# Patient Record
Sex: FEMALE | Race: WHITE | NOT HISPANIC OR LATINO | Employment: OTHER | URBAN - NONMETROPOLITAN AREA
[De-identification: names, ages, dates, MRNs, and addresses within clinical notes are randomized per-mention and may not be internally consistent; named-entity substitution may affect disease eponyms.]

---

## 2023-02-28 LAB — URINE CULTURE: NORMAL

## 2023-03-03 LAB
GENITAL CULTURE: ABNORMAL
GENITAL CULTURE: ABNORMAL

## 2023-03-28 LAB
APPEARANCE, URINE: ABNORMAL
BACTERIA, URINE: ABNORMAL /HPF
BILIRUBIN, URINE: NEGATIVE
BLOOD, URINE: NEGATIVE
COLOR, URINE: YELLOW
GLUCOSE, URINE: NEGATIVE MG/DL
KETONES, URINE: NEGATIVE MG/DL
LEUKOCYTE ESTERASE, URINE: NEGATIVE
NITRITE, URINE: NEGATIVE
PH, URINE: 7 (ref 5–8)
PROTEIN, URINE: ABNORMAL MG/DL
RBC, URINE: <1 /HPF (ref 0–5)
SPECIFIC GRAVITY, URINE: 1.02 (ref 1–1.03)
SQUAMOUS EPITHELIAL CELLS, URINE: 1 /HPF
UROBILINOGEN, URINE: <2 MG/DL (ref 0–1.9)
WBC, URINE: <1 /HPF (ref 0–5)

## 2023-04-17 LAB
APPEARANCE, URINE: CLEAR
BILIRUBIN, URINE: NEGATIVE
BLOOD, URINE: ABNORMAL
COLOR, URINE: YELLOW
GLUCOSE, URINE: NEGATIVE MG/DL
KETONES, URINE: NEGATIVE MG/DL
LEUKOCYTE ESTERASE, URINE: ABNORMAL
MUCUS, URINE: ABNORMAL /LPF
NITRITE, URINE: NEGATIVE
PH, URINE: 7 (ref 5–8)
PROTEIN, URINE: NEGATIVE MG/DL
RBC, URINE: 8 /HPF (ref 0–5)
SPECIFIC GRAVITY, URINE: 1.01 (ref 1–1.03)
SQUAMOUS EPITHELIAL CELLS, URINE: 1 /HPF
UROBILINOGEN, URINE: <2 MG/DL (ref 0–1.9)
WBC, URINE: 12 /HPF (ref 0–5)

## 2023-04-19 LAB — URINE CULTURE: ABNORMAL

## 2023-06-26 DIAGNOSIS — I10 HYPERTENSION, UNSPECIFIED TYPE: ICD-10-CM

## 2023-06-26 DIAGNOSIS — E78.5 HYPERLIPIDEMIA, UNSPECIFIED HYPERLIPIDEMIA TYPE: ICD-10-CM

## 2023-06-26 DIAGNOSIS — K21.9 GASTROESOPHAGEAL REFLUX DISEASE, UNSPECIFIED WHETHER ESOPHAGITIS PRESENT: ICD-10-CM

## 2023-06-26 RX ORDER — HYDROCHLOROTHIAZIDE 12.5 MG/1
1 TABLET ORAL DAILY
COMMUNITY
End: 2023-06-26 | Stop reason: SDUPTHER

## 2023-06-26 RX ORDER — ATENOLOL 50 MG/1
50 TABLET ORAL DAILY
Qty: 30 TABLET | Refills: 1 | Status: SHIPPED | OUTPATIENT
Start: 2023-06-26 | End: 2023-10-09 | Stop reason: SDUPTHER

## 2023-06-26 RX ORDER — HYDROCHLOROTHIAZIDE 12.5 MG/1
12.5 TABLET ORAL DAILY
Qty: 30 TABLET | Refills: 1 | Status: SHIPPED | OUTPATIENT
Start: 2023-06-26 | End: 2023-07-10 | Stop reason: SDUPTHER

## 2023-06-26 RX ORDER — PANTOPRAZOLE SODIUM 40 MG/1
1 TABLET, DELAYED RELEASE ORAL DAILY
COMMUNITY
Start: 2020-03-13 | End: 2023-06-26 | Stop reason: SDUPTHER

## 2023-06-26 RX ORDER — ATENOLOL 50 MG/1
1 TABLET ORAL DAILY
COMMUNITY
End: 2023-06-26 | Stop reason: SDUPTHER

## 2023-06-26 RX ORDER — PANTOPRAZOLE SODIUM 40 MG/1
40 TABLET, DELAYED RELEASE ORAL DAILY
Qty: 30 TABLET | Refills: 1 | Status: SHIPPED | OUTPATIENT
Start: 2023-06-26 | End: 2023-10-09 | Stop reason: SDUPTHER

## 2023-06-26 RX ORDER — ROSUVASTATIN CALCIUM 10 MG/1
10 TABLET, COATED ORAL DAILY
Qty: 30 TABLET | Refills: 1 | Status: SHIPPED | OUTPATIENT
Start: 2023-06-26 | End: 2023-10-09 | Stop reason: SDUPTHER

## 2023-06-26 RX ORDER — ROSUVASTATIN CALCIUM 10 MG/1
1 TABLET, COATED ORAL DAILY
COMMUNITY
End: 2023-06-26 | Stop reason: SDUPTHER

## 2023-07-05 ENCOUNTER — TELEPHONE (OUTPATIENT)
Dept: PRIMARY CARE | Facility: CLINIC | Age: 78
End: 2023-07-05
Payer: MEDICARE

## 2023-07-05 DIAGNOSIS — J31.0 CHRONIC RHINITIS: ICD-10-CM

## 2023-07-05 DIAGNOSIS — I10 HYPERTENSION, UNSPECIFIED TYPE: ICD-10-CM

## 2023-07-05 DIAGNOSIS — G45.9 TIA (TRANSIENT ISCHEMIC ATTACK): Primary | ICD-10-CM

## 2023-07-05 RX ORDER — ASPIRIN 81 MG/1
81 TABLET ORAL DAILY
COMMUNITY
Start: 2023-01-03 | End: 2023-07-05 | Stop reason: SDUPTHER

## 2023-07-05 RX ORDER — FLUTICASONE PROPIONATE 50 MCG
2 SPRAY, SUSPENSION (ML) NASAL DAILY
COMMUNITY
End: 2023-07-05 | Stop reason: SDUPTHER

## 2023-07-05 RX ORDER — ASPIRIN 81 MG/1
81 TABLET ORAL DAILY
Qty: 90 TABLET | Refills: 3 | Status: SHIPPED | OUTPATIENT
Start: 2023-07-05 | End: 2024-02-28 | Stop reason: SDUPTHER

## 2023-07-05 RX ORDER — FLUTICASONE PROPIONATE 50 MCG
2 SPRAY, SUSPENSION (ML) NASAL DAILY
Qty: 48 G | Refills: 3 | Status: SHIPPED | OUTPATIENT
Start: 2023-07-05 | End: 2024-02-28 | Stop reason: SDUPTHER

## 2023-07-10 ENCOUNTER — TELEPHONE (OUTPATIENT)
Dept: PRIMARY CARE | Facility: CLINIC | Age: 78
End: 2023-07-10

## 2023-07-10 ENCOUNTER — APPOINTMENT (OUTPATIENT)
Dept: PRIMARY CARE | Facility: CLINIC | Age: 78
End: 2023-07-10
Payer: MEDICARE

## 2023-07-10 ENCOUNTER — OFFICE VISIT (OUTPATIENT)
Dept: PRIMARY CARE | Facility: CLINIC | Age: 78
End: 2023-07-10
Payer: MEDICARE

## 2023-07-10 VITALS
SYSTOLIC BLOOD PRESSURE: 138 MMHG | DIASTOLIC BLOOD PRESSURE: 82 MMHG | HEIGHT: 64 IN | BODY MASS INDEX: 29.49 KG/M2 | HEART RATE: 76 BPM | WEIGHT: 172.7 LBS | OXYGEN SATURATION: 96 %

## 2023-07-10 DIAGNOSIS — R60.9 PERIPHERAL EDEMA: Primary | ICD-10-CM

## 2023-07-10 DIAGNOSIS — I10 HYPERTENSION, UNSPECIFIED TYPE: ICD-10-CM

## 2023-07-10 DIAGNOSIS — I50.22 CHRONIC SYSTOLIC CONGESTIVE HEART FAILURE (MULTI): ICD-10-CM

## 2023-07-10 DIAGNOSIS — I82.461 ACUTE DEEP VEIN THROMBOSIS (DVT) OF CALF MUSCLE VEIN OF RIGHT LOWER EXTREMITY (MULTI): ICD-10-CM

## 2023-07-10 LAB
APPEARANCE, URINE: ABNORMAL
BACTERIA, URINE: ABNORMAL /HPF
BILIRUBIN, URINE: NEGATIVE
BLOOD, URINE: NEGATIVE
BUDDING YEAST, URINE: PRESENT /HPF
COLOR, URINE: YELLOW
GLUCOSE, URINE: NEGATIVE MG/DL
KETONES, URINE: NEGATIVE MG/DL
LEUKOCYTE ESTERASE, URINE: ABNORMAL
MUCUS, URINE: ABNORMAL /LPF
NITRITE, URINE: NEGATIVE
PH, URINE: 7 (ref 5–8)
PROTEIN, URINE: NEGATIVE MG/DL
RBC, URINE: 2 /HPF (ref 0–5)
RENAL EPITHELIAL CELLS, URINE: 1 /HPF
SPECIFIC GRAVITY, URINE: 1.01 (ref 1–1.03)
SQUAMOUS EPITHELIAL CELLS, URINE: 22 /HPF
UROBILINOGEN, URINE: <2 MG/DL (ref 0–1.9)
WBC, URINE: 29 /HPF (ref 0–5)

## 2023-07-10 PROCEDURE — 1036F TOBACCO NON-USER: CPT | Performed by: FAMILY MEDICINE

## 2023-07-10 PROCEDURE — 3079F DIAST BP 80-89 MM HG: CPT | Performed by: FAMILY MEDICINE

## 2023-07-10 PROCEDURE — 99214 OFFICE O/P EST MOD 30 MIN: CPT | Performed by: FAMILY MEDICINE

## 2023-07-10 PROCEDURE — 3075F SYST BP GE 130 - 139MM HG: CPT | Performed by: FAMILY MEDICINE

## 2023-07-10 PROCEDURE — 1160F RVW MEDS BY RX/DR IN RCRD: CPT | Performed by: FAMILY MEDICINE

## 2023-07-10 PROCEDURE — 1159F MED LIST DOCD IN RCRD: CPT | Performed by: FAMILY MEDICINE

## 2023-07-10 RX ORDER — HYDROCHLOROTHIAZIDE 25 MG/1
25 TABLET ORAL DAILY
Qty: 90 TABLET | Refills: 3 | Status: SHIPPED | OUTPATIENT
Start: 2023-07-10 | End: 2024-02-28 | Stop reason: SDUPTHER

## 2023-07-10 NOTE — PROGRESS NOTES
"Subjective   Patient ID: Cele Lunsford is a 78 y.o. female who presents for Leg Swelling (X 1 mo).    HPI feelsa off send ua to urogyn awaits result.  Leg edema increased, inc hydrochlorothiazide to 25mg and if not better 1 montyh swithch to lasix and get echo  Differential diagnosis for vascular ischemic and hypertensive heart disease reviewed.  Also known to have heart failure.  Review of Systems  Denies PND orthopnea.  No chest pains pressure heaviness.  Objective   /82   Pulse 76   Ht 1.626 m (5' 4\")   Wt 78.3 kg (172 lb 11.2 oz)   SpO2 96%   BMI 29.64 kg/m²     Physical Exam  Neck supple heart regular lungs clear extremities 1+ edema  Assessment/Plan   Problem List Items Addressed This Visit          Coag and Thromboembolic    Acute deep vein thrombosis (DVT) of calf muscle vein of right lower extremity (CMS/HCC)     Other Visit Diagnoses       Peripheral edema    -  Primary    Relevant Medications    hydroCHLOROthiazide (HYDRODiuril) 25 mg tablet    Hypertension, unspecified type        Relevant Medications    hydroCHLOROthiazide (HYDRODiuril) 25 mg tablet    Chronic systolic congestive heart failure (CMS/HCC)        Relevant Orders    Transthoracic echo (TTE) complete        Increase chlorothiazide 25 mg daily not better in 2 weeks consider replacing with Lasix and getting echocardiogram       "

## 2023-07-11 LAB — URINE CULTURE: ABNORMAL

## 2023-07-14 ENCOUNTER — TELEPHONE (OUTPATIENT)
Dept: PRIMARY CARE | Facility: CLINIC | Age: 78
End: 2023-07-14
Payer: MEDICARE

## 2023-07-14 NOTE — TELEPHONE ENCOUNTER
----- Message from Jimmy Mchugh MD sent at 7/13/2023  5:25 PM EDT -----  Let patient know echo is okay

## 2023-08-22 LAB
APPEARANCE, URINE: ABNORMAL
BACTERIA, URINE: ABNORMAL /HPF
BILIRUBIN, URINE: NEGATIVE
BLOOD, URINE: NEGATIVE
COLOR, URINE: YELLOW
GLUCOSE, URINE: NEGATIVE MG/DL
KETONES, URINE: NEGATIVE MG/DL
LEUKOCYTE ESTERASE, URINE: ABNORMAL
MUCUS, URINE: ABNORMAL /LPF
NITRITE, URINE: NEGATIVE
PH, URINE: 7 (ref 5–8)
PROTEIN, URINE: ABNORMAL MG/DL
RBC, URINE: 10 /HPF (ref 0–5)
SPECIFIC GRAVITY, URINE: 1.02 (ref 1–1.03)
SQUAMOUS EPITHELIAL CELLS, URINE: 4 /HPF
UROBILINOGEN, URINE: <2 MG/DL (ref 0–1.9)
WBC CLUMPS, URINE: ABNORMAL /HPF
WBC, URINE: >182 /HPF (ref 0–5)

## 2023-08-24 ENCOUNTER — OFFICE VISIT (OUTPATIENT)
Dept: PRIMARY CARE | Facility: CLINIC | Age: 78
End: 2023-08-24
Payer: MEDICARE

## 2023-08-24 VITALS
OXYGEN SATURATION: 98 % | SYSTOLIC BLOOD PRESSURE: 140 MMHG | BODY MASS INDEX: 29.23 KG/M2 | WEIGHT: 171.2 LBS | DIASTOLIC BLOOD PRESSURE: 86 MMHG | HEART RATE: 68 BPM | HEIGHT: 64 IN

## 2023-08-24 DIAGNOSIS — I82.461 ACUTE DEEP VEIN THROMBOSIS (DVT) OF CALF MUSCLE VEIN OF RIGHT LOWER EXTREMITY (MULTI): ICD-10-CM

## 2023-08-24 DIAGNOSIS — I10 BENIGN ESSENTIAL HTN: ICD-10-CM

## 2023-08-24 DIAGNOSIS — J30.2 SEASONAL ALLERGIES: ICD-10-CM

## 2023-08-24 DIAGNOSIS — E78.2 MIXED HYPERLIPIDEMIA: ICD-10-CM

## 2023-08-24 DIAGNOSIS — K21.9 GASTROESOPHAGEAL REFLUX DISEASE WITHOUT ESOPHAGITIS: ICD-10-CM

## 2023-08-24 DIAGNOSIS — Z00.00 ROUTINE GENERAL MEDICAL EXAMINATION AT HEALTH CARE FACILITY: Primary | ICD-10-CM

## 2023-08-24 PROBLEM — I82.409 DVT (DEEP VENOUS THROMBOSIS) (MULTI): Status: RESOLVED | Noted: 2023-08-24 | Resolved: 2023-08-24

## 2023-08-24 PROBLEM — E78.5 HYPERLIPEMIA: Status: ACTIVE | Noted: 2023-08-24

## 2023-08-24 PROBLEM — I82.409 DVT (DEEP VENOUS THROMBOSIS) (MULTI): Status: ACTIVE | Noted: 2023-08-24

## 2023-08-24 PROCEDURE — 3077F SYST BP >= 140 MM HG: CPT | Performed by: FAMILY MEDICINE

## 2023-08-24 PROCEDURE — 1036F TOBACCO NON-USER: CPT | Performed by: FAMILY MEDICINE

## 2023-08-24 PROCEDURE — 3079F DIAST BP 80-89 MM HG: CPT | Performed by: FAMILY MEDICINE

## 2023-08-24 PROCEDURE — 1160F RVW MEDS BY RX/DR IN RCRD: CPT | Performed by: FAMILY MEDICINE

## 2023-08-24 PROCEDURE — 1170F FXNL STATUS ASSESSED: CPT | Performed by: FAMILY MEDICINE

## 2023-08-24 PROCEDURE — 1159F MED LIST DOCD IN RCRD: CPT | Performed by: FAMILY MEDICINE

## 2023-08-24 PROCEDURE — 99214 OFFICE O/P EST MOD 30 MIN: CPT | Performed by: FAMILY MEDICINE

## 2023-08-24 PROCEDURE — G0439 PPPS, SUBSEQ VISIT: HCPCS | Performed by: FAMILY MEDICINE

## 2023-08-24 ASSESSMENT — ACTIVITIES OF DAILY LIVING (ADL)
DOING_HOUSEWORK: INDEPENDENT
GROCERY_SHOPPING: INDEPENDENT
DRESSING: INDEPENDENT
MANAGING_FINANCES: INDEPENDENT
TAKING_MEDICATION: INDEPENDENT
BATHING: INDEPENDENT

## 2023-08-24 ASSESSMENT — ENCOUNTER SYMPTOMS
OCCASIONAL FEELINGS OF UNSTEADINESS: 1
LOSS OF SENSATION IN FEET: 0
DEPRESSION: 0

## 2023-08-24 ASSESSMENT — PATIENT HEALTH QUESTIONNAIRE - PHQ9
2. FEELING DOWN, DEPRESSED OR HOPELESS: NOT AT ALL
1. LITTLE INTEREST OR PLEASURE IN DOING THINGS: NOT AT ALL
SUM OF ALL RESPONSES TO PHQ9 QUESTIONS 1 AND 2: 0

## 2023-08-24 NOTE — PROGRESS NOTES
"Subjective   Reason for Visit: Cele Lunsford is an 78 y.o. female here for a Medicare Wellness visit.     Past Medical, Surgical, and Family History reviewed and updated in chart.    Reviewed all medications by prescribing practitioner or clinical pharmacist (such as prescriptions, OTCs, herbal therapies and supplements) and documented in the medical record.    HPI  1 yr ago a fall woth leg injury, Since the last office visit there have been no interval operations, hospitalizations, important illnesses or injuries.  Dr royal is treating for uti  HTN-Takes and tolerates meds without side effects. No alcohol. no tobacco. no exercise. low salt.  Reviewed recommendation for 150 minutes of exercise per week including 2 days of weight training if over age 50  Hyperlipidemia- is on a statin and a prudent diet.  GERD-Takes PPI daily with occbreakthrough symptoms, occ pepcid.  Reviewed dietary, caffeine, tobacco, alcohol, and NSAID avoidance. No dyspepsia, dysphagia, reflux, melena, or abdominal pain.  Allergic rhinits use s nasal steroid and claritin  Uti-  Patient Care Team:  Jimmy Mchugh MD as PCP - General  Jim[facility] Mchugh [facility]     Review of Systems  General-no fatigue weight to within 10 pounds  ENT no problems with vision swallowing  Cardiac no chest pains palpitations change in exercise tolerance or capacity  Pulmonary no cough shortness of breath  GI no heartburn or abdominal pain  Musculoskeletal no joint pains      Objective   Vitals:  /86   Pulse 68   Ht 1.626 m (5' 4\")   Wt 77.7 kg (171 lb 3.2 oz)   SpO2 98%   BMI 29.39 kg/m²       Physical Exam  General:  Alert, No acute distress. Appears stated age  Eye:  Pupils are equal, round and reactive to light, Extraocular movements are intact, Normal conjunctiva.    Neck:  Supple, Non-tender, No carotid bruit, No jugular venous distention, No lymphadenopathy, No thyromegaly.    Respiratory:  Lungs are " clear to auscultation, Respirations are non-labored, Breath sounds are equal.    Cardiovascular:  Normal rate, Regular rhythm, No murmur.    Gastrointestinal:  Soft, Non-tender, No organomegaly. No solid or pulsatile mass  Integumentary:  Warm, Dry. No concerning lesions on exposed areas  Neurologic:  Alert, Oriented.  Gross and fine motor intact, CN 2-12 intact  Psychiatric:  Cooperative, Appropriate mood & affect.    Assessment/Plan   Problem List Items Addressed This Visit       Acute deep vein thrombosis (DVT) of calf muscle vein of right lower extremity (CMS/HCC)    Overview     Dvt sept 2022, completed course of eliquis         Benign essential HTN    Hyperlipemia    Seasonal allergies    Gastroesophageal reflux disease without esophagitis     Other Visit Diagnoses       Routine general medical examination at health care facility    -  Primary

## 2023-08-25 LAB — URINE CULTURE: ABNORMAL

## 2023-10-09 DIAGNOSIS — I10 HYPERTENSION, UNSPECIFIED TYPE: ICD-10-CM

## 2023-10-09 DIAGNOSIS — E78.5 HYPERLIPIDEMIA, UNSPECIFIED HYPERLIPIDEMIA TYPE: ICD-10-CM

## 2023-10-09 DIAGNOSIS — K21.9 GASTROESOPHAGEAL REFLUX DISEASE, UNSPECIFIED WHETHER ESOPHAGITIS PRESENT: ICD-10-CM

## 2023-10-09 RX ORDER — ATENOLOL 50 MG/1
50 TABLET ORAL DAILY
Qty: 90 TABLET | Refills: 3 | Status: SHIPPED | OUTPATIENT
Start: 2023-10-09 | End: 2024-02-28 | Stop reason: SDUPTHER

## 2023-10-09 RX ORDER — ROSUVASTATIN CALCIUM 10 MG/1
10 TABLET, COATED ORAL DAILY
Qty: 90 TABLET | Refills: 3 | Status: SHIPPED | OUTPATIENT
Start: 2023-10-09 | End: 2024-02-28 | Stop reason: SDUPTHER

## 2023-10-09 RX ORDER — PANTOPRAZOLE SODIUM 40 MG/1
40 TABLET, DELAYED RELEASE ORAL DAILY
Qty: 90 TABLET | Refills: 3 | Status: SHIPPED | OUTPATIENT
Start: 2023-10-09 | End: 2024-02-28 | Stop reason: SDUPTHER

## 2023-11-15 ENCOUNTER — EVALUATION (OUTPATIENT)
Dept: PHYSICAL THERAPY | Facility: CLINIC | Age: 78
End: 2023-11-15
Payer: MEDICARE

## 2023-11-15 DIAGNOSIS — M54.2 CERVICALGIA: Primary | ICD-10-CM

## 2023-11-15 DIAGNOSIS — S03.00XD DISLOCATION OF TEMPOROMANDIBULAR JOINT, SUBSEQUENT ENCOUNTER: ICD-10-CM

## 2023-11-15 PROBLEM — S03.00XA TMJ (DISLOCATION OF TEMPOROMANDIBULAR JOINT): Status: ACTIVE | Noted: 2023-11-15

## 2023-11-15 PROCEDURE — 97140 MANUAL THERAPY 1/> REGIONS: CPT | Mod: GP

## 2023-11-15 PROCEDURE — 97161 PT EVAL LOW COMPLEX 20 MIN: CPT | Mod: GP

## 2023-11-15 ASSESSMENT — PATIENT HEALTH QUESTIONNAIRE - PHQ9
SUM OF ALL RESPONSES TO PHQ9 QUESTIONS 1 AND 2: 0
2. FEELING DOWN, DEPRESSED OR HOPELESS: NOT AT ALL
1. LITTLE INTEREST OR PLEASURE IN DOING THINGS: NOT AT ALL

## 2023-11-15 ASSESSMENT — ENCOUNTER SYMPTOMS
OCCASIONAL FEELINGS OF UNSTEADINESS: 1
DEPRESSION: 0
LOSS OF SENSATION IN FEET: 0

## 2023-11-15 ASSESSMENT — PAIN SCALES - GENERAL: PAINLEVEL_OUTOF10: 0 - NO PAIN

## 2023-11-15 ASSESSMENT — PAIN - FUNCTIONAL ASSESSMENT: PAIN_FUNCTIONAL_ASSESSMENT: 0-10

## 2023-11-15 NOTE — PROGRESS NOTES
Physical Therapy    Physical Therapy Evaluation and Treatment      Patient Name: Cele Lunsford  MRN: [id number]  Today's Date: 11/15/2023  Time Calculation  Start Time: 1130  Stop Time: 1215  Time Calculation (min): 45 min      Assessment:  PT Assessment Results: Decreased strength, Decreased range of motion, Pain  Rehab Prognosis: Good  Evaluation/Treatment Tolerance: Patient tolerated treatment well  Strengths: Ability to acquire knowledge, Capable of completing ADLs semi/independent, Physical health  Barriers to Participation:  (chronicity of pain)  Patient demonstrating pain of R ear/cervical region, deficits in cervical AROM, restriction of surrounding cervical, B GH joint musculature, deficits of cervical, B GH joint, and periscapular musculature strength, and deficits in functional mobility per TMD Disability Index. This impairs patient's ability to complete ADLs/iADLs painfree. Patient would benefit from skilled PT interventions 2x/week for 6 additional visits to address above impairments and improve QOL. Educated in cervical mobility exercises with multiple verbal and tactile cues for proper form. STM of cervical/shoulder region with patient denoting decrease in pain and fullness of R ear following manual therapy techniques with no numeric value provided. HEP handout provided.    Plan:  Treatment/Interventions: Hot pack, Education/ Instruction, Manual therapy, Therapeutic exercises, Taping techniques  PT Plan: Skilled PT  PT Frequency: 2 times per week  Duration: 6 additional visits in POC  Onset Date: 10/24/23  Certification Period Start Date: 11/15/23  Certification Period End Date: 02/13/24    Current Problem:   1. Cervicalgia  Follow Up In Physical Therapy      2. Dislocation of temporomandibular joint, subsequent encounter  Referral to Physical Therapy    Follow Up In Physical Therapy          Subjective    General: Patient presenting with R ear pain/clogged sensation. Notes with cooler weather it  has improved. No issues with the L ear. Pain also radiates down the cervical region. She takes allergy medicine and aleve which somewhat alleviates symptoms. Per patient, she has arthritis present with a eustachian tube twisted. No current follow-up plan with referring provider. Pain that radiates into the R shoulder. No pattern to pain. Some days pain is worse than others.  General  Reason for Referral: TMJ/Ear pain  Referred By: Cornell REN  Precautions:  Precautions  STEADI Fall Risk Score (The score of 4 or more indicates an increased risk of falling): 1  Precautions Comment: Hx of osteoporosis, HTN, Hx of DVT of LE    Pain:  Pain Assessment  Pain Assessment: 0-10  Pain Score: 0 - No pain  Home Living:   No concerns for home set-up  Prior Level of Function:  Prior Function Per Pt/Caregiver Report  Level of [place]: Independent with ADLs and functional transfers    Objective   Cervical Spine    Shoulder Observation  Cervical Posture: slightly forward head    Cervical AROM WFL unless documented below  Cervical flexion: (80°): 40 deg  Cervical extension: (50°): 38 deg  Cervical Rotation: (80°): 40 deg  Cervical rotation left: (80°): 20 deg  Cervical sidebend right: (45°): 15 deg  Cervical sidebend left: (45°): 55 deg  Shoulder AROM WFL unless documented below  R Shoulder flexion: (180°): WFL  L Shoulder flexion: (180°): WFL  R shoulder abduction: (180°): WFL  L Shoulder abduction: (180°): WFL  R Shoulder ER: (90°): WFL  L shoulder ER: (90°): WFL  R shoulder IR: (70°): mild restriction  L shoulder IR: (70°): mild restriction    Cervical Myotomes (MMT) WFL unless documented below  Cervical Myotomes (MMT) WFL:  (gross cervical musculature 4-/5 MMT in all planes)  Shoulder Strength WFL unless documented below  R shoulder flexion: (5/5): 4  L shoulder flexion: (5/5): 4  R shoulder abduction: (5/5): 4  L shoulder abduction: (5/5): 4  R shoulder ER: (5/5): 4-  L shoulder ER: (5/5): 4-  R shoulder IR: (5/5) : 4-  L  shoulder IR: (5/5): 4-  Scapular MMT WFL unless documented below  Scapular MMT WFL:  (weak retraction with scapular squeeze)  Outcome Measures:  Other Measures  TMD Disability Index: 2/40=5%     Treatments:  Therapeutic Exercise  Therapeutic Exercise Performed: Yes  UT stretch 3x20 second each direction  Seated scalene stretch 3x20 second each direction    Manual Therapy  Manual Therapy Performed: Yes  STM with patient in hooklying of B scalenes, UT, SCM, cervical paraspinals, suboccipitals R>L with moderate to heavy manual pressure    OP EDUCATION:  Outpatient Education  Individual(s) Educated: Patient  Education Provided: Home Exercise Program, POC  Risk and Benefits Discussed with Patient/Caregiver/Other: yes  Patient/Caregiver Demonstrated Understanding: yes  Plan of Care Discussed and Agreed Upon: yes  Patient Response to Education: Patient/Caregiver Verbalized Understanding of Information, Patient/Caregiver Performed Return Demonstration of Exercises/Activities, Patient/Caregiver Asked Appropriate Questions  Education Comment: Access Code: [email or web address]  URL: [email or web address]  Date: 11/15/2023  Prepared by: Brittany Salcido    Exercises  - Seated Upper Trapezius Stretch  - 1 x daily - 7 x weekly - 1 sets - 3 reps - 20-30second hold  - Seated Scalene Stretch with Towel  - 1 x daily - 7 x weekly - 1 sets - 3 reps - 20-30second hold    Goals:  Active       PT Problem       PT Goals       Start:  11/19/23    Expected End:  12/31/23       Decrease in TMD Disability Index score by 5% to demonstrate improved painfree functional mobility for ease with completing ADLs/iADLs-Week 3  Improved gross cervical , B GH joint, and B periscapular musculature strength 5/5 MMT to increase stability with completing household duties.-Week 3  Decrease in baseline ear pain 0/10 or less to improve QOL-Week 3  Improved cervical AROM  flex: 45 deg ext: 45 deg, R LF: 55 deg, B rot: 70 deg to increase painfree  functional mobility of cervical range for ease with ADL/iADL completion-Week 3  Patient will demonstrate compliance in their home exercise program in order to promote independence in self management of functional mobility.-Week 1

## 2023-12-01 ENCOUNTER — TREATMENT (OUTPATIENT)
Dept: PHYSICAL THERAPY | Facility: CLINIC | Age: 78
End: 2023-12-01
Payer: MEDICARE

## 2023-12-01 DIAGNOSIS — M54.2 CERVICALGIA: Primary | ICD-10-CM

## 2023-12-01 DIAGNOSIS — S03.00XD DISLOCATION OF TEMPOROMANDIBULAR JOINT, SUBSEQUENT ENCOUNTER: ICD-10-CM

## 2023-12-01 PROCEDURE — 97110 THERAPEUTIC EXERCISES: CPT | Mod: GP

## 2023-12-01 PROCEDURE — 97140 MANUAL THERAPY 1/> REGIONS: CPT | Mod: GP

## 2023-12-01 ASSESSMENT — PAIN - FUNCTIONAL ASSESSMENT: PAIN_FUNCTIONAL_ASSESSMENT: 0-10

## 2023-12-01 ASSESSMENT — PAIN SCALES - GENERAL: PAINLEVEL_OUTOF10: 1

## 2023-12-01 NOTE — PROGRESS NOTES
Physical Therapy    Physical Therapy Treatment    Patient Name: Cele Lunsford  MRN: [id number]  Today's Date: 12/1/2023   Time In: 1045  Time Out: 1128  Total Time: 43 min      Assessment:   Emphasis of postural strengthening and cervical mobility. Verbal, visual, and tactile cues for proper form with stretches as well as for form with scapular strengthening to reduce compensation. Verbal cues for controlled motion. Increased restriction of B SCM, scalenes, and UT. Reduced pain and improved mobility at end of session.      Plan: Progress with postural strengthening and cervical mobility to reduce ear pain and improve ease with completion of ADLs/iADLs  OP PT Plan  Treatment/Interventions: Hot pack, Education/ Instruction, Manual therapy, Therapeutic exercises, Taping techniques  PT Plan: Skilled PT  PT Frequency: 2 times per week  Duration: 6 additional visits in POC  Onset Date: 10/24/23  Certification Period Start Date: 11/15/23  Certification Period End Date: 02/13/24    Current Problem  1. Cervicalgia  Follow Up In Physical Therapy    Follow Up In Physical Therapy      2. Dislocation of temporomandibular joint, subsequent encounter  Follow Up In Physical Therapy    Follow Up In Physical Therapy          Subjective    Patient reports she is attempting to be compliant with HEP. Mild pain of ear this date. Says some days pain is there and some days it is not.     Precautions   Precautions  STEADI Fall Risk Score (The score of 4 or more indicates an increased risk of falling): 1  Precautions Comment: Hx of osteoporosis, HTN, Hx of DVT of LE      Pain   Pain Assessment  Pain Score: 1  Pain Location: Ear      Objective     Treatments:  Therapeutic Exercise  Therapeutic Exercise Performed: Yes  UBE x4 min bwd   Scapular row teal tube 2x10  B GH joint extension teal tube 2x10  Levator scap stretch 3x20 second each direction  UT stretch 3x20 second each direction  B GH joint horizontal abduction orange band x20  B  GH joint ER orange band x20    Manual Therapy  Manual Therapy Performed: Yes  STM of cervical paraspinals, suboccipitals, B SCM, B scalenes, B UT, B levator scap R>L with moderate to heavy manual pressure    OP EDUCATION:       Goals:  Active       PT Problem       PT Goals       Start:  11/19/23    Expected End:  12/31/23       Decrease in TMD Disability Index score by 5% to demonstrate improved painfree functional mobility for ease with completing ADLs/iADLs-Week 3  Improved gross cervical , B GH joint, and B periscapular musculature strength 5/5 MMT to increase stability with completing household duties.-Week 3  Decrease in baseline ear pain 0/10 or less to improve QOL-Week 3  Improved cervical AROM  flex: 45 deg ext: 45 deg, R LF: 55 deg, B rot: 70 deg to increase painfree functional mobility of cervical range for ease with ADL/iADL completion-Week 3  Patient will demonstrate compliance in their home exercise program in order to promote independence in self management of functional mobility.-Week 1

## 2023-12-05 ENCOUNTER — TELEPHONE (OUTPATIENT)
Dept: PRIMARY CARE | Facility: CLINIC | Age: 78
End: 2023-12-05
Payer: MEDICARE

## 2023-12-05 DIAGNOSIS — J02.9 PHARYNGITIS, UNSPECIFIED ETIOLOGY: Primary | ICD-10-CM

## 2023-12-05 RX ORDER — AZITHROMYCIN 250 MG/1
TABLET, FILM COATED ORAL
Qty: 6 TABLET | Refills: 0 | Status: SHIPPED | OUTPATIENT
Start: 2023-12-05 | End: 2024-02-28 | Stop reason: WASHOUT

## 2023-12-05 NOTE — TELEPHONE ENCOUNTER
PA\DARIAN WITH SAME SYMPTOMS  HER  HAS.   SCRATCHY THROAT,COUGH, HEAD CONGESTION. HOARSE, NO FEVER.   NEGATIVE CO VID TEST.   WOULD LIKE A CHRISTINA GONZALEZ.   [facility]

## 2023-12-06 ENCOUNTER — TREATMENT (OUTPATIENT)
Dept: PHYSICAL THERAPY | Facility: CLINIC | Age: 78
End: 2023-12-06
Payer: MEDICARE

## 2023-12-06 DIAGNOSIS — S03.00XD DISLOCATION OF TEMPOROMANDIBULAR JOINT, SUBSEQUENT ENCOUNTER: ICD-10-CM

## 2023-12-06 DIAGNOSIS — M54.2 CERVICALGIA: ICD-10-CM

## 2023-12-06 PROCEDURE — 97140 MANUAL THERAPY 1/> REGIONS: CPT | Mod: GP

## 2023-12-06 ASSESSMENT — PAIN - FUNCTIONAL ASSESSMENT: PAIN_FUNCTIONAL_ASSESSMENT: 0-10

## 2023-12-06 ASSESSMENT — PAIN SCALES - GENERAL: PAINLEVEL_OUTOF10: 3

## 2023-12-06 NOTE — PROGRESS NOTES
Physical Therapy    Physical Therapy Treatment    Patient Name: Cele Lunsford  MRN: [id number]  Today's Date: 12/6/2023  Time Calculation  Start Time: 0955  Stop Time: 1030  Time Calculation (min): 35 min      Assessment:   Patient 10 minutes late to session so shortened session. Emphasis of manual therapy treatment this date to address restriction and reduce pain of R ear. Increased restriction and tenderness of R UT, levator scap, and R SCM. Improved mobility of cervical region and reduction of ear pain at end of session without numeric value provided.      Plan: Progress with postural strengthening with manual therapy techniques PRN to reduce ear pain and improve ease with completion of ADLs/iADLs  OP PT Plan  Treatment/Interventions: Hot pack, Education/ Instruction, Manual therapy, Therapeutic exercises, Taping techniques  PT Plan: Skilled PT  PT Frequency: 2 times per week  Duration: 6 additional visits in POC  Onset Date: 10/24/23  Certification Period Start Date: 11/15/23  Certification Period End Date: 02/13/24    Current Problem  1. Dislocation of temporomandibular joint, subsequent encounter  Follow Up In Physical Therapy      2. Cervicalgia  Follow Up In Physical Therapy            Subjective    Patient states that she has laryngitis but has been on antibiotic. Notes that when she does get sick her ear pain worsens. Does also think some of her symptoms are from the dust on [place] decorations.     Precautions  Precautions  STEADI Fall Risk Score (The score of 4 or more indicates an increased risk of falling): 1  Precautions Comment: Hx of osteoporosis, HTN, Hx of DVT of LE    Pain  Pain Assessment  Pain Assessment: 0-10  Pain Score: 3  Pain Type: Chronic pain  Pain Location: EarPain Assessment  Pain Score: 3  Pain Location: Ear      Objective     Treatments:  Therapeutic Exercise  Therapeutic Exercise Performed: No  Below not performed  UBE x4 min bwd   Scapular row teal tube 2x10  B GH joint  extension teal tube 2x10  Levator scap stretch 3x20 second each direction  UT stretch 3x20 second each direction  B GH joint horizontal abduction orange band x20  B GH joint ER orange band x20    Manual Therapy  Manual Therapy Performed: Yes  STM of cervical paraspinals, suboccipitals, B SCM, B scalenes, B UT, B levator scap R>L with moderate to heavy manual pressure    OP EDUCATION:       Goals:  Active       PT Problem       PT Goals       Start:  11/19/23    Expected End:  12/31/23       Decrease in TMD Disability Index score by 5% to demonstrate improved painfree functional mobility for ease with completing ADLs/iADLs-Week 3  Improved gross cervical , B GH joint, and B periscapular musculature strength 5/5 MMT to increase stability with completing household duties.-Week 3  Decrease in baseline ear pain 0/10 or less to improve QOL-Week 3  Improved cervical AROM  flex: 45 deg ext: 45 deg, R LF: 55 deg, B rot: 70 deg to increase painfree functional mobility of cervical range for ease with ADL/iADL completion-Week 3  Patient will demonstrate compliance in their home exercise program in order to promote independence in self management of functional mobility.-Week 1

## 2023-12-07 ENCOUNTER — TREATMENT (OUTPATIENT)
Dept: PHYSICAL THERAPY | Facility: CLINIC | Age: 78
End: 2023-12-07
Payer: MEDICARE

## 2023-12-07 DIAGNOSIS — S03.00XD DISLOCATION OF TEMPOROMANDIBULAR JOINT, SUBSEQUENT ENCOUNTER: ICD-10-CM

## 2023-12-07 DIAGNOSIS — M54.2 CERVICALGIA: ICD-10-CM

## 2023-12-07 PROCEDURE — 97110 THERAPEUTIC EXERCISES: CPT | Mod: GP

## 2023-12-07 PROCEDURE — 97140 MANUAL THERAPY 1/> REGIONS: CPT | Mod: GP

## 2023-12-07 ASSESSMENT — PAIN - FUNCTIONAL ASSESSMENT: PAIN_FUNCTIONAL_ASSESSMENT: 0-10

## 2023-12-07 ASSESSMENT — PAIN SCALES - GENERAL: PAINLEVEL_OUTOF10: 2

## 2023-12-07 NOTE — PROGRESS NOTES
Physical Therapy    Physical Therapy Treatment    Patient Name: Cele Lunsford  MRN: [id number]  Today's Date: 12/7/2023  Time Calculation  Start Time: 0930  Stop Time: 1013  Time Calculation (min): 43 min      Assessment:   Patient able to progress with postural strengthening and stability. Does require verbal cues for sets/reps. Difficulty coordinating chin tuck with visual, verbal, and tactile cues to complete. Fatigue of B GH joint with horizontal abduction. Continued restriction of UT, levator scap, scalenes, and SCM R>L. Decreased feeling of fullness and improved mobility at end of session    Plan: Progress with postural strengthening and cervical strengthening to improve ease with household/community duties painfree.    OP PT Plan  Treatment/Interventions: Hot pack, Education/ Instruction, Manual therapy, Therapeutic exercises, Taping techniques  PT Plan: Skilled PT  PT Frequency: 2 times per week  Duration: 6 additional visits in POC  Onset Date: 10/24/23  Certification Period Start Date: 11/15/23  Certification Period End Date: 02/13/24    Current Problem  1. Dislocation of temporomandibular joint, subsequent encounter  Follow Up In Physical Therapy      2. Cervicalgia  Follow Up In Physical Therapy            Subjective    Patient states she is feeling better overall. Pain decreases after session. Continues to have fullness in R ear that will decrease after session but restrictions increase when stressed about completion of tasks.    Precautions  Precautions  STEADI Fall Risk Score (The score of 4 or more indicates an increased risk of falling): 1  Precautions Comment: Hx of osteoporosis, HTN, Hx of DVT of LE    Pain  Pain Assessment  Pain Assessment: 0-10  Pain Score: 2  Pain Type: Chronic pain  Pain Location: Ear  Pain Orientation: Right      Objective     Treatments:  Therapeutic Exercise  Therapeutic Exercise Performed: Yes  UBE x3 min fwd/bwd   Scapular row purple tube 2x10 P  B GH joint purple  teal tube 2x10 P  Levator scap stretch 3x20 second each direction  UT stretch 3x20 second each direction  B GH joint horizontal abduction green band x20 P  B GH joint ER green band x20  B GH joint horizontal abduction diagonal green band x10 each direction N  B GH joint flex 2lb x10 N  B GH joint horizontal abduction 2 lb x10 N  Chin tucks x10 N  Review of Thera-cane for soft tissue massage at home for cervical/GH joint      Manual Therapy  Manual Therapy Performed: Yes  STM of cervical paraspinals, suboccipitals, B SCM, B scalenes, B UT, B levator scap R>L with moderate to heavy manual pressure    OP EDUCATION:       Goals:  Active       PT Problem       PT Goals       Start:  11/19/23    Expected End:  12/31/23       Decrease in TMD Disability Index score by 5% to demonstrate improved painfree functional mobility for ease with completing ADLs/iADLs-Week 3  Improved gross cervical , B GH joint, and B periscapular musculature strength 5/5 MMT to increase stability with completing household duties.-Week 3  Decrease in baseline ear pain 0/10 or less to improve QOL-Week 3  Improved cervical AROM  flex: 45 deg ext: 45 deg, R LF: 55 deg, B rot: 70 deg to increase painfree functional mobility of cervical range for ease with ADL/iADL completion-Week 3  Patient will demonstrate compliance in their home exercise program in order to promote independence in self management of functional mobility.-Week 1

## 2023-12-19 ENCOUNTER — TREATMENT (OUTPATIENT)
Dept: PHYSICAL THERAPY | Facility: CLINIC | Age: 78
End: 2023-12-19
Payer: MEDICARE

## 2023-12-19 DIAGNOSIS — M54.2 CERVICALGIA: ICD-10-CM

## 2023-12-19 DIAGNOSIS — S03.00XD DISLOCATION OF TEMPOROMANDIBULAR JOINT, SUBSEQUENT ENCOUNTER: ICD-10-CM

## 2023-12-19 PROCEDURE — 97110 THERAPEUTIC EXERCISES: CPT | Mod: GP

## 2023-12-19 ASSESSMENT — PAIN - FUNCTIONAL ASSESSMENT: PAIN_FUNCTIONAL_ASSESSMENT: 0-10

## 2023-12-19 ASSESSMENT — PAIN SCALES - GENERAL: PAINLEVEL_OUTOF10: 0 - NO PAIN

## 2023-12-19 NOTE — PROGRESS NOTES
Physical Therapy    Physical Therapy Treatment    Patient Name: Cele Lunsford  MRN: [id number]  Today's Date: 12/19/2023  Time Calculation  Start Time: 1318  Stop Time: 1400  Time Calculation (min): 42 min      Assessment:   Patient has made good progress toward goals in regards to R ear pain/cervical pain. Patient able to progress with postural strengthening with half bolster exercises as well as with shoulder rolls. Verbal cues to engage periscapular musculature and prevent rounded shoulder posture with exercise. Visual and verbal cues for chin tuck. Patient would benefit from continued skilled PT services for 2 remaining visits in POC. All current PT goals in progress.    Plan:     OP PT Plan  Treatment/Interventions: Hot pack, Education/ Instruction, Manual therapy, Therapeutic exercises, Taping techniques  PT Plan: Skilled PT  PT Frequency: 2 times per week  Duration: 6 additional visits in POC  Onset Date: 10/24/23  Certification Period Start Date: 11/15/23  Certification Period End Date: 02/13/24    Current Problem  1. Dislocation of temporomandibular joint, subsequent encounter  Follow Up In Physical Therapy      2. Cervicalgia  Follow Up In Physical Therapy              Subjective    Patient states she felt good over vacation. Continues to have fullness/pain but not as severe as prior to starting PT interventions.     Precautions  Precautions  STEADI Fall Risk Score (The score of 4 or more indicates an increased risk of falling): 1  Precautions Comment: Hx of osteoporosis, HTN, Hx of DVT of LE    Pain  Pain Assessment  Pain Assessment: 0-10  Pain Score: 0 - No pain      Objective     Treatments:  Therapeutic Exercise  Therapeutic Exercise Performed: Yes  UBE LEVEL 1.5 x3 min fwd/bwd P  Scapular row pink tube 2x10 P  B GH joint pink teal tube 2x10 P  R GH joint ER/IR orange band 2x10 each UE   B GH joint horizontal abduction green band x20   B GH joint ER green band 2x10  B GH joint horizontal abduction  diagonal green band x15 each direction P  Half bolster standing  -snow angels x15 N  -open book x15 N  R GH joint flex 1lb x20 P  R GH joint horizontal abduction 1 lb x20 P  Chin tucks x10 P  Posterior shoulder rolls x20 N  Scapular retraction x20 N  Levator scap stretch 3x20 second each direction  UT stretch 3x20 second each direction    Manual Therapy  Manual Therapy Performed: No  Below not performed  STM of cervical paraspinals, suboccipitals, B SCM, B scalenes, B UT, B levator scap R>L with moderate to heavy manual pressure    OP EDUCATION:       Goals:  Active       PT Problem       PT Goals       Start:  11/19/23    Expected End:  12/31/23       Decrease in TMD Disability Index score by 5% to demonstrate improved painfree functional mobility for ease with completing ADLs/iADLs-Week 3  Improved gross cervical , B GH joint, and B periscapular musculature strength 5/5 MMT to increase stability with completing household duties.-Week 3  Decrease in baseline ear pain 0/10 or less to improve QOL-Week 3  Improved cervical AROM  flex: 45 deg ext: 45 deg, R LF: 55 deg, B rot: 70 deg to increase painfree functional mobility of cervical range for ease with ADL/iADL completion-Week 3  Patient will demonstrate compliance in their home exercise program in order to promote independence in self management of functional mobility.-Week 1

## 2023-12-26 ENCOUNTER — APPOINTMENT (OUTPATIENT)
Dept: PHYSICAL THERAPY | Facility: CLINIC | Age: 78
End: 2023-12-26
Payer: MEDICARE

## 2023-12-28 ENCOUNTER — APPOINTMENT (OUTPATIENT)
Dept: PHYSICAL THERAPY | Facility: CLINIC | Age: 78
End: 2023-12-28
Payer: MEDICARE

## 2024-01-03 ENCOUNTER — APPOINTMENT (OUTPATIENT)
Dept: PHYSICAL THERAPY | Facility: CLINIC | Age: 79
End: 2024-01-03
Payer: MEDICARE

## 2024-01-10 ENCOUNTER — TREATMENT (OUTPATIENT)
Dept: PHYSICAL THERAPY | Facility: CLINIC | Age: 79
End: 2024-01-10
Payer: MEDICARE

## 2024-01-10 DIAGNOSIS — S03.00XD DISLOCATION OF TEMPOROMANDIBULAR JOINT, SUBSEQUENT ENCOUNTER: ICD-10-CM

## 2024-01-10 DIAGNOSIS — M54.2 CERVICALGIA: ICD-10-CM

## 2024-01-10 PROCEDURE — 97110 THERAPEUTIC EXERCISES: CPT | Mod: GP

## 2024-01-10 ASSESSMENT — PAIN SCALES - GENERAL: PAINLEVEL_OUTOF10: 0 - NO PAIN

## 2024-01-10 ASSESSMENT — PAIN - FUNCTIONAL ASSESSMENT: PAIN_FUNCTIONAL_ASSESSMENT: 0-10

## 2024-01-10 NOTE — PROGRESS NOTES
Physical Therapy    Physical Therapy Treatment    Patient Name: Cele Lunsford  MRN: [id number]  Today's Date: 1/10/2024  Time Calculation  Start Time: 0950  Stop Time: 1029  Time Calculation (min): 39 min      Assessment:   Cele Lunsford is progressing fairly through their POC addressing R ear pain/TMJ pain. Pt has attended 6 PT sessions including initial evaluation with therapeutic exercise, manual therapy, and HEP interventions. The pt demonstrates and verbalizes improvements in R ear pain, cervical ROM, and B GH joint/periscapular strength. However, patient does continue to have feeling of fullness occasionally in R ear as well as decreased cervical ROM. Review of HEP with inclusion of levator scap stretch. Verbal cues for periscapular engagement with postural exercises. No change in pain post treatment.    Pt is being placed on hold for 30 days to attempt performing their home exercise program independently. Pt instructed to contact with any problems, questions, or adjustments. This will serve as the patient's discharge if they elect not to resume skilled PT within 30 days. Pt verbalized understanding and agreement to goals and POC. Thank you for this referral and please call [phone] with any questions or concerns.    Plan:   Pt is being placed on hold for 30 days to attempt performing their home exercise program independently. Pt instructed to contact with any problems, questions, or adjustments. This will serve as the patient's discharge if they elect not to resume skilled PT within 30 days.   OP PT Plan  PT Plan: No Additional PT interventions required at this time  Onset Date: 10/24/23  Certification Period Start Date: 11/15/23  Certification Period End Date: 02/13/24    Current Problem  1. Cervicalgia  Follow Up In Physical Therapy      2. Dislocation of temporomandibular joint, subsequent encounter  Follow Up In Physical Therapy                Subjective    Patient reports that she was  feeling better until her ear felt more plugged. She says that when she is sick her ear will plug up. Did have follow up with referring provider. Attempts to be compliant in HEP.    Precautions  Precautions  Precautions Comment: Hx of osteoporosis, HTN, Hx of DVT of LE    Pain  Pain Assessment  Pain Assessment: 0-10  Pain Score: 0 - No pain      Objective   Cervical Spine     Cervical AROM WFL unless documented below  Cervical flexion: (80°): 40 deg>40  Cervical extension: (50°): 38 deg>40  Cervical Rotation: (80°): 40 deg>40  Cervical rotation left: (80°): 20 deg>30  Cervical sidebend right: (45°): 15 deg>20  Cervical sidebend left: (45°): 55 deg>WFL     Cervical Myotomes (MMT) WFL unless documented below  Cervical Myotomes (MMT) WFL:  (gross cervical musculature 4-/5 MMT in all planes)>4/5  Shoulder Strength WFL unless documented below  R shoulder flexion: (5/5): 4>5/5  L shoulder flexion: (5/5): 4>5/5  R shoulder abduction: (5/5): 4>5/5  L shoulder abduction: (5/5): 4>5/5  R shoulder ER: (5/5): 4->4/5  L shoulder ER: (5/5): 4->4/5  R shoulder IR: (5/5) : 4->4/5  L shoulder IR: (5/5): 4->4/5    Treatments:  Therapeutic Exercise  Therapeutic Exercise Performed: Yes  HEP and POC review  UBE LEVEL 2.5 x3 min fwd/bwd P  Scapular row pink tube 2x10  B GH joint pink teal tube 2x10  R GH joint ER/IR orange band 2x10 each UE   B GH joint horizontal abduction green band x20   B GH joint ER green band 2x15 P  B GH joint horizontal abduction diagonal green band x20 each direction P  Half bolster standingX  -snow angels x20   -open book x20   R GH joint flex 1lb x20 X  R GH joint horizontal abduction 1 lb x20 X  Chin tucks x20   Posterior shoulder rolls x20  Scapular retraction x20 X  Levator scap stretch 3x20 second each direction  UT stretch 3x20 second each direction  Levator scap stretch 2x20 second each direction N    Manual Therapy  Manual Therapy Performed: No  Below not performed  STM of cervical paraspinals,  suboccipitals, B SCM, B scalenes, B UT, B levator scap R>L with moderate to heavy manual pressure    OP EDUCATION:  Outpatient Education  Individual(s) Educated: Patient  Education Provided: Home Exercise Program, POC  Risk and Benefits Discussed with Patient/Caregiver/Other: yes  Patient/Caregiver Demonstrated Understanding: yes  Plan of Care Discussed and Agreed Upon: yes  Patient Response to Education: Patient/Caregiver Verbalized Understanding of Information, Patient/Caregiver Performed Return Demonstration of Exercises/Activities, Patient/Caregiver Asked Appropriate Questions  Education Comment: Access Code: [email or web address]  URL: [email or web address]  Date: 01/10/2024  Prepared by: Brittany Salcido    Exercises  - Seated Gentle Upper Trapezius Stretch  - 1 x daily - 7 x weekly - 1 sets - 3 reps - 20-30 second hold    Goals:  Active       PT Problem       PT Goals       Start:  11/19/23    Expected End:  12/31/23       Decrease in TMD Disability Index score by 5% to demonstrate improved painfree functional mobility for ease with completing ADLs/iADLs-Week 3-NT  Improved gross cervical , B GH joint, and B periscapular musculature strength 5/5 MMT to increase stability with completing household duties.-Week 3-PARTIALLY MET  Decrease in baseline ear pain 0/10 or less to improve QOL-Week 3-PARTIALLY MET  Improved cervical AROM  flex: 45 deg ext: 45 deg, R LF: 55 deg, B rot: 70 deg to increase painfree functional mobility of cervical range for ease with ADL/iADL completion-Week 3-PARTIALLY MET  Patient will demonstrate compliance in their home exercise program in order to promote independence in self management of functional mobility.-Week 1-PARTIALLY MET

## 2024-02-21 ENCOUNTER — LAB (OUTPATIENT)
Dept: LAB | Facility: LAB | Age: 79
End: 2024-02-21
Payer: MEDICARE

## 2024-02-21 DIAGNOSIS — I10 BENIGN ESSENTIAL HTN: ICD-10-CM

## 2024-02-21 DIAGNOSIS — E78.2 MIXED HYPERLIPIDEMIA: ICD-10-CM

## 2024-02-21 LAB
ALBUMIN SERPL BCP-MCNC: 4.3 G/DL (ref 3.4–5)
ALP SERPL-CCNC: 57 U/L (ref 33–136)
ALT SERPL W P-5'-P-CCNC: 22 U/L (ref 7–45)
ANION GAP SERPL CALC-SCNC: 10 MMOL/L (ref 10–20)
AST SERPL W P-5'-P-CCNC: 19 U/L (ref 9–39)
BILIRUB SERPL-MCNC: 1 MG/DL (ref 0–1.2)
BUN SERPL-MCNC: 16 MG/DL (ref 6–23)
CALCIUM SERPL-MCNC: 9.2 MG/DL (ref 8.6–10.3)
CHLORIDE SERPL-SCNC: 99 MMOL/L (ref 98–107)
CHOLEST SERPL-MCNC: 167 MG/DL (ref 0–199)
CHOLESTEROL/HDL RATIO: 2.5
CO2 SERPL-SCNC: 31 MMOL/L (ref 21–32)
CREAT SERPL-MCNC: 0.67 MG/DL (ref 0.5–1.05)
EGFRCR SERPLBLD CKD-EPI 2021: 89 ML/MIN/1.73M*2
ERYTHROCYTE [DISTWIDTH] IN BLOOD BY AUTOMATED COUNT: 11.9 % (ref 11.5–14.5)
GLUCOSE SERPL-MCNC: 104 MG/DL (ref 74–99)
HCT VFR BLD AUTO: 45.1 % (ref 36–46)
HDLC SERPL-MCNC: 68 MG/DL
HGB BLD-MCNC: 14.9 G/DL (ref 12–16)
LDLC SERPL CALC-MCNC: 82 MG/DL
MCH RBC QN AUTO: 30.7 PG (ref 26–34)
MCHC RBC AUTO-ENTMCNC: 33 G/DL (ref 32–36)
MCV RBC AUTO: 93 FL (ref 80–100)
NON HDL CHOLESTEROL: 99 MG/DL (ref 0–149)
NRBC BLD-RTO: 0 /100 WBCS (ref 0–0)
PLATELET # BLD AUTO: 234 X10*3/UL (ref 150–450)
POTASSIUM SERPL-SCNC: 4.3 MMOL/L (ref 3.5–5.3)
PROT SERPL-MCNC: 6.4 G/DL (ref 6.4–8.2)
RBC # BLD AUTO: 4.85 X10*6/UL (ref 4–5.2)
SODIUM SERPL-SCNC: 136 MMOL/L (ref 136–145)
TRIGL SERPL-MCNC: 86 MG/DL (ref 0–149)
VLDL: 17 MG/DL (ref 0–40)
WBC # BLD AUTO: 4.8 X10*3/UL (ref 4.4–11.3)

## 2024-02-21 PROCEDURE — 85027 COMPLETE CBC AUTOMATED: CPT

## 2024-02-21 PROCEDURE — 36415 COLL VENOUS BLD VENIPUNCTURE: CPT

## 2024-02-21 PROCEDURE — 80053 COMPREHEN METABOLIC PANEL: CPT

## 2024-02-21 PROCEDURE — 80061 LIPID PANEL: CPT

## 2024-02-28 ENCOUNTER — OFFICE VISIT (OUTPATIENT)
Dept: PRIMARY CARE | Facility: CLINIC | Age: 79
End: 2024-02-28
Payer: MEDICARE

## 2024-02-28 VITALS
WEIGHT: 171.1 LBS | HEIGHT: 64 IN | HEART RATE: 72 BPM | DIASTOLIC BLOOD PRESSURE: 84 MMHG | OXYGEN SATURATION: 97 % | SYSTOLIC BLOOD PRESSURE: 138 MMHG | BODY MASS INDEX: 29.21 KG/M2

## 2024-02-28 DIAGNOSIS — B37.0 THRUSH: ICD-10-CM

## 2024-02-28 DIAGNOSIS — E78.2 MIXED HYPERLIPIDEMIA: ICD-10-CM

## 2024-02-28 DIAGNOSIS — I10 HYPERTENSION, UNSPECIFIED TYPE: ICD-10-CM

## 2024-02-28 DIAGNOSIS — G45.9 TIA (TRANSIENT ISCHEMIC ATTACK): ICD-10-CM

## 2024-02-28 DIAGNOSIS — Z12.31 BREAST CANCER SCREENING BY MAMMOGRAM: ICD-10-CM

## 2024-02-28 DIAGNOSIS — K21.9 GASTROESOPHAGEAL REFLUX DISEASE WITHOUT ESOPHAGITIS: ICD-10-CM

## 2024-02-28 DIAGNOSIS — J30.2 SEASONAL ALLERGIES: ICD-10-CM

## 2024-02-28 DIAGNOSIS — Z00.00 ROUTINE GENERAL MEDICAL EXAMINATION AT HEALTH CARE FACILITY: ICD-10-CM

## 2024-02-28 DIAGNOSIS — R60.9 PERIPHERAL EDEMA: ICD-10-CM

## 2024-02-28 DIAGNOSIS — K21.9 GASTROESOPHAGEAL REFLUX DISEASE, UNSPECIFIED WHETHER ESOPHAGITIS PRESENT: ICD-10-CM

## 2024-02-28 DIAGNOSIS — E78.5 HYPERLIPIDEMIA, UNSPECIFIED HYPERLIPIDEMIA TYPE: ICD-10-CM

## 2024-02-28 DIAGNOSIS — I82.461 ACUTE DEEP VEIN THROMBOSIS (DVT) OF CALF MUSCLE VEIN OF RIGHT LOWER EXTREMITY (MULTI): ICD-10-CM

## 2024-02-28 DIAGNOSIS — I10 BENIGN ESSENTIAL HTN: Primary | ICD-10-CM

## 2024-02-28 DIAGNOSIS — J31.0 CHRONIC RHINITIS: ICD-10-CM

## 2024-02-28 PROBLEM — N39.46 MIXED STRESS AND URGE URINARY INCONTINENCE: Status: ACTIVE | Noted: 2024-02-28

## 2024-02-28 PROCEDURE — 1126F AMNT PAIN NOTED NONE PRSNT: CPT | Performed by: FAMILY MEDICINE

## 2024-02-28 PROCEDURE — 1036F TOBACCO NON-USER: CPT | Performed by: FAMILY MEDICINE

## 2024-02-28 PROCEDURE — 3079F DIAST BP 80-89 MM HG: CPT | Performed by: FAMILY MEDICINE

## 2024-02-28 PROCEDURE — 99214 OFFICE O/P EST MOD 30 MIN: CPT | Performed by: FAMILY MEDICINE

## 2024-02-28 PROCEDURE — 1160F RVW MEDS BY RX/DR IN RCRD: CPT | Performed by: FAMILY MEDICINE

## 2024-02-28 PROCEDURE — 1159F MED LIST DOCD IN RCRD: CPT | Performed by: FAMILY MEDICINE

## 2024-02-28 PROCEDURE — 3075F SYST BP GE 130 - 139MM HG: CPT | Performed by: FAMILY MEDICINE

## 2024-02-28 RX ORDER — VIBEGRON 75 MG/1
TABLET, FILM COATED ORAL
COMMUNITY
Start: 2023-06-28 | End: 2024-02-28 | Stop reason: SDUPTHER

## 2024-02-28 RX ORDER — HYDROCHLOROTHIAZIDE 25 MG/1
25 TABLET ORAL DAILY
Qty: 90 TABLET | Refills: 3 | Status: SHIPPED | OUTPATIENT
Start: 2024-02-28 | End: 2025-02-27

## 2024-02-28 RX ORDER — ASPIRIN 81 MG/1
81 TABLET ORAL DAILY
Qty: 90 TABLET | Refills: 3 | Status: SHIPPED | OUTPATIENT
Start: 2024-02-28 | End: 2025-02-27

## 2024-02-28 RX ORDER — ROSUVASTATIN CALCIUM 10 MG/1
10 TABLET, COATED ORAL DAILY
Qty: 90 TABLET | Refills: 3 | Status: SHIPPED | OUTPATIENT
Start: 2024-02-28 | End: 2025-02-27

## 2024-02-28 RX ORDER — PANTOPRAZOLE SODIUM 40 MG/1
40 TABLET, DELAYED RELEASE ORAL DAILY
Qty: 90 TABLET | Refills: 3 | Status: SHIPPED | OUTPATIENT
Start: 2024-02-28 | End: 2025-02-27

## 2024-02-28 RX ORDER — VIBEGRON 75 MG/1
75 TABLET, FILM COATED ORAL DAILY
Qty: 90 TABLET | Refills: 3 | Status: SHIPPED | OUTPATIENT
Start: 2024-02-28 | End: 2025-02-27

## 2024-02-28 RX ORDER — FLUTICASONE PROPIONATE 50 MCG
2 SPRAY, SUSPENSION (ML) NASAL DAILY
Qty: 48 G | Refills: 3 | Status: SHIPPED | OUTPATIENT
Start: 2024-02-28 | End: 2025-02-27

## 2024-02-28 RX ORDER — NYSTATIN 100000 [USP'U]/ML
500000 SUSPENSION ORAL 4 TIMES DAILY
Qty: 280 ML | Refills: 3 | Status: SHIPPED | OUTPATIENT
Start: 2024-02-28 | End: 2024-04-28

## 2024-02-28 RX ORDER — ATENOLOL 50 MG/1
50 TABLET ORAL DAILY
Qty: 90 TABLET | Refills: 3 | Status: SHIPPED | OUTPATIENT
Start: 2024-02-28 | End: 2025-02-27

## 2024-02-28 NOTE — PROGRESS NOTES
"Subjective   Patient ID: Cele Lunsford is a 79 y.o. female who presents for Follow-up (6 mo rev labs).    HPI Since the last office visit there have been no interval operations, hospitalizations, important illnesses or injuries.  HTN-Takes and tolerates meds without side effects. No alcohol. no tobacco. no exercise. low salt.  Reviewed recommendation for 150 minutes of exercise per week including 2 days of weight training if over age 50  Hyperlipidemia- is on a statin and a prudent diet.  GERD-Takes PPI daily with no breakthrough symptoms.  Reviewed dietary, caffeine, tobacco, alcohol, and NSAID avoidance. No dyspepsia, dysphagia, reflux, melena, or abdominal pain.  John give gemtesa, tried others, and helps.   HCC for history of DVT in the past.  Seasonal allergies managed with fluticasone.  Has thrush when she takes an oral antibiotic requests refill of nystatin oral suspension.  HCC for TIA shows no  Review of Systems  General-no fatigue weight to within 10 pounds  ENT no problems with vision swallowing  Cardiac no chest pains palpitations change in exercise tolerance or capacity  Pulmonary no cough shortness of breath  GI no heartburn or abdominal pain  Musculoskeletal no joint pains    Objective   /84   Pulse 72   Ht 1.626 m (5' 4\")   Wt 77.6 kg (171 lb 1.6 oz)   SpO2 97%   BMI 29.37 kg/m²     Physical Exam  General:  Alert, No acute distress. Appears stated age  Eye:  Pupils are equal, round and reactive to light, Extraocular movements are intact, Normal conjunctiva.    Neck:  Supple, Non-tender, No carotid bruit, No jugular venous distention, No lymphadenopathy, No thyromegaly.    Respiratory:  Lungs are clear to auscultation, Respirations are non-labored, Breath sounds are equal.    Cardiovascular:  Normal rate, Regular rhythm, No murmur.    Gastrointestinal:  Soft, Non-tender, No organomegaly. No solid or pulsatile mass  Integumentary:  Warm, Dry. No concerning lesions on exposed " areas  Neurologic:  Alert, Oriented.  Gross and fine motor intact, CN 2-12 intact  Psychiatric:  Cooperative, Appropriate mood & affect.    Assessment/Plan   Problem List Items Addressed This Visit             ICD-10-CM    TIA (transient ischemic attack) G45.9    Relevant Medications    aspirin 81 mg EC tablet    Acute deep vein thrombosis (DVT) of calf muscle vein of right lower extremity (CMS/HCC) I82.461    Benign essential HTN - Primary I10    Relevant Orders    Follow Up In Primary Care    Hyperlipemia E78.5    Relevant Medications    vibegron (Gemtesa) 75 mg tablet    rosuvastatin (Crestor) 10 mg tablet    Other Relevant Orders    Follow Up In Primary Care    Seasonal allergies J30.2    Gastroesophageal reflux disease without esophagitis K21.9    Relevant Orders    Follow Up In Primary Care     Other Visit Diagnoses         Codes    Routine general medical examination at health care facility     Z00.00    Breast cancer screening by mammogram     Z12.31    Relevant Orders    BI mammo bilateral screening tomosynthesis    Thrush     B37.0    Relevant Medications    nystatin (Mycostatin) 100,000 unit/mL suspension    Hypertension, unspecified type     I10    Relevant Medications    atenolol (Tenormin) 50 mg tablet    hydroCHLOROthiazide (HYDRODiuril) 25 mg tablet    Chronic rhinitis     J31.0    Relevant Medications    fluticasone (Flonase) 50 mcg/actuation nasal spray    Peripheral edema     R60.9    Relevant Medications    hydroCHLOROthiazide (HYDRODiuril) 25 mg tablet    Gastroesophageal reflux disease, unspecified whether esophagitis present     K21.9    Relevant Medications    pantoprazole (ProtoNix) 40 mg EC tablet

## 2024-03-11 ENCOUNTER — APPOINTMENT (OUTPATIENT)
Dept: RADIOLOGY | Facility: CLINIC | Age: 79
End: 2024-03-11
Payer: MEDICARE

## 2024-03-14 ENCOUNTER — HOSPITAL ENCOUNTER (OUTPATIENT)
Dept: RADIOLOGY | Facility: CLINIC | Age: 79
Discharge: HOME | End: 2024-03-14
Payer: MEDICARE

## 2024-03-14 DIAGNOSIS — Z12.31 BREAST CANCER SCREENING BY MAMMOGRAM: ICD-10-CM

## 2024-03-14 PROCEDURE — 77067 SCR MAMMO BI INCL CAD: CPT

## 2024-03-14 PROCEDURE — 77067 SCR MAMMO BI INCL CAD: CPT | Performed by: RADIOLOGY

## 2024-03-14 PROCEDURE — 77063 BREAST TOMOSYNTHESIS BI: CPT | Performed by: RADIOLOGY

## 2024-04-16 ENCOUNTER — LAB (OUTPATIENT)
Dept: LAB | Facility: LAB | Age: 79
End: 2024-04-16
Payer: MEDICARE

## 2024-04-16 ENCOUNTER — TELEPHONE (OUTPATIENT)
Dept: OBSTETRICS AND GYNECOLOGY | Facility: CLINIC | Age: 79
End: 2024-04-16

## 2024-04-16 DIAGNOSIS — R30.0 DYSURIA: ICD-10-CM

## 2024-04-16 DIAGNOSIS — N39.0 ACUTE UTI: Primary | ICD-10-CM

## 2024-04-16 LAB
APPEARANCE UR: ABNORMAL
BACTERIA #/AREA URNS AUTO: ABNORMAL /HPF
BILIRUB UR STRIP.AUTO-MCNC: NEGATIVE MG/DL
COLOR UR: YELLOW
GLUCOSE UR STRIP.AUTO-MCNC: NEGATIVE MG/DL
KETONES UR STRIP.AUTO-MCNC: NEGATIVE MG/DL
LEUKOCYTE ESTERASE UR QL STRIP.AUTO: ABNORMAL
MUCOUS THREADS #/AREA URNS AUTO: ABNORMAL /LPF
NITRITE UR QL STRIP.AUTO: POSITIVE
PH UR STRIP.AUTO: 6 [PH]
PROT UR STRIP.AUTO-MCNC: NEGATIVE MG/DL
RBC # UR STRIP.AUTO: NEGATIVE /UL
RBC #/AREA URNS AUTO: ABNORMAL /HPF
SP GR UR STRIP.AUTO: 1.01
SQUAMOUS #/AREA URNS AUTO: ABNORMAL /HPF
UROBILINOGEN UR STRIP.AUTO-MCNC: <2 MG/DL
WBC #/AREA URNS AUTO: >50 /HPF
WBC CLUMPS #/AREA URNS AUTO: ABNORMAL /HPF

## 2024-04-16 PROCEDURE — 87086 URINE CULTURE/COLONY COUNT: CPT

## 2024-04-16 PROCEDURE — 81001 URINALYSIS AUTO W/SCOPE: CPT

## 2024-04-16 PROCEDURE — 87186 SC STD MICRODIL/AGAR DIL: CPT

## 2024-04-17 LAB — HOLD SPECIMEN: NORMAL

## 2024-04-17 RX ORDER — NITROFURANTOIN 25; 75 MG/1; MG/1
100 CAPSULE ORAL 2 TIMES DAILY
Qty: 10 CAPSULE | Refills: 0 | Status: SHIPPED | OUTPATIENT
Start: 2024-04-17 | End: 2024-04-22

## 2024-04-17 NOTE — TELEPHONE ENCOUNTER
Patient called for her UA/culture results and to inquire if antibiotic would be called to pharmacy. Culture results not back yet, Urinalysis/Micro in chart, please review and advise. Thank you.

## 2024-04-19 LAB — BACTERIA UR CULT: ABNORMAL

## 2024-04-23 ENCOUNTER — OFFICE VISIT (OUTPATIENT)
Dept: OBSTETRICS AND GYNECOLOGY | Facility: CLINIC | Age: 79
End: 2024-04-23
Payer: MEDICARE

## 2024-04-23 VITALS
OXYGEN SATURATION: 98 % | SYSTOLIC BLOOD PRESSURE: 143 MMHG | HEART RATE: 83 BPM | BODY MASS INDEX: 29.35 KG/M2 | DIASTOLIC BLOOD PRESSURE: 93 MMHG | RESPIRATION RATE: 20 BRPM | WEIGHT: 171 LBS

## 2024-04-23 DIAGNOSIS — N39.0 ACUTE UTI: Primary | ICD-10-CM

## 2024-04-23 DIAGNOSIS — N81.10 POP-Q STAGE 2 CYSTOCELE: ICD-10-CM

## 2024-04-23 DIAGNOSIS — K59.09 OTHER CONSTIPATION: ICD-10-CM

## 2024-04-23 DIAGNOSIS — N32.81 OAB (OVERACTIVE BLADDER): ICD-10-CM

## 2024-04-23 PROCEDURE — 99214 OFFICE O/P EST MOD 30 MIN: CPT | Performed by: STUDENT IN AN ORGANIZED HEALTH CARE EDUCATION/TRAINING PROGRAM

## 2024-04-23 PROCEDURE — 1159F MED LIST DOCD IN RCRD: CPT | Performed by: STUDENT IN AN ORGANIZED HEALTH CARE EDUCATION/TRAINING PROGRAM

## 2024-04-23 PROCEDURE — 1160F RVW MEDS BY RX/DR IN RCRD: CPT | Performed by: STUDENT IN AN ORGANIZED HEALTH CARE EDUCATION/TRAINING PROGRAM

## 2024-04-23 PROCEDURE — 1126F AMNT PAIN NOTED NONE PRSNT: CPT | Performed by: STUDENT IN AN ORGANIZED HEALTH CARE EDUCATION/TRAINING PROGRAM

## 2024-04-23 PROCEDURE — 3077F SYST BP >= 140 MM HG: CPT | Performed by: STUDENT IN AN ORGANIZED HEALTH CARE EDUCATION/TRAINING PROGRAM

## 2024-04-23 PROCEDURE — 3080F DIAST BP >= 90 MM HG: CPT | Performed by: STUDENT IN AN ORGANIZED HEALTH CARE EDUCATION/TRAINING PROGRAM

## 2024-04-23 RX ORDER — NITROFURANTOIN 25; 75 MG/1; MG/1
100 CAPSULE ORAL 2 TIMES DAILY
Qty: 10 CAPSULE | Refills: 0 | Status: SHIPPED | OUTPATIENT
Start: 2024-04-23 | End: 2024-04-28

## 2024-04-23 ASSESSMENT — PAIN SCALES - GENERAL: PAINLEVEL: 0-NO PAIN

## 2024-04-23 NOTE — PATIENT INSTRUCTIONS
Fiber Therapy  Fiber acts in the colon (large bowel) to make the stool soft.  If the stool is too hard, the fiber draws water in and makes it softer.  If the stool is too watery, it acts like a 'sponge' and soaks up the liquid.    Many foods contain fiber. Fruits, vegetables, whole grains, and high fiber cereals all contain some fiber. Unfortunately, most of us don't eat enough and a fiber supplement is a good way to increase soluble fiber intake.    Supplemental fiber comes in many. You could choose from:   Powder  Tablets  Wafers/Cookies/Gummies  Some common brands include:  Benefiber (tasteless powder)  Metamucil (powder)  Konsyl (powder)  Citrucel (powder, may cause less gas)  Fiber-Con (tablet)  All of these products work in the same way, but some may work better than others for you.   Dosing:    One tablespoon of powder dissolves in 8-16 oz of water or juice OR  Two tablets with 8-16 oz water or juice OR  Two fiber wafers/cookies with 8-16 oz of water or juice  Take fiber in the morning. Start off using it once per day. You can then increase frequency if needed.   IF FIBER IS NOT TAKEN WITH ADEQUATE WATER/FLUID INTAKE, IT MAY BE CONSTIPATING.  DRINK 6-8 GLASSES OF WATER/LIQUIDS THROUGHOUT THE DAY WHILE TAKING FIBER SUPPLEMENTATION.  Fiber can cause gas/bloating, especially when first starting the treatment.  If this is the case, you can take simethicone (Gas-X) over the counter after meals and at bedtime as needed.   Summary:  1.  Remember: the most common cause of constipation is inadequate water intake during the day. Avoiding dehydration is usually the key to success with fiber supplementation.  2. Using fiber requires some experimentation- if one brand doesn't work or causes excessive gas, take another. Also, try varying the form of fiber- for example, if the powder is unpalatable; take the tablets or wafers instead.  3. You may need more than one dose per day- feel free to increase your dose to morning  and mid-day if that works better.   4. Results depend on consistency of usage- the more consistent you are in taking fiber, the better the results!

## 2024-04-23 NOTE — PROGRESS NOTES
HISTORY OF PRESENT ILLNESS:  Cele Lunsford is a 79 y.o. female, who presents in follow up for OAB, POP    During last encounter on 6/28/23, reviewed and agreed to the following:  Plan:   1. OAB, nocturia  - PVR = 22ccs by bladder U/S.   - Previously failed Trospium 20mg BID as this medication caused her to have constipation. She has tried Gemtesa samples that worked to manage her OAB sx well but was cost-prohibitive. She has failed Oxybutynin in the past. Now she has failed Myrbetriq as this did not improve her urinary sx.   - Patient is amenable to trying Gemtesa again with the Rx sent to [facility] pharmacy for 90 day supply to see if this lowers the cost.   - Sent Rx of Gemtesa 75mg 1 pill po QD #90 day supply to her preferred pharmacy.   - If Gemtesa is still cost-prohibitive we will start her on the higher dose of Myrbetriq 50mg daily to see if she gets any bladder sx relief.      2. POP  - Continue performing pessary maintenance at home at minimum every 2 months since this is managing her prolapse sx well.      Follow-up in 9 months with Dr. Sonam Herman (March 2024) for annual pelvic exam with pessary maintenance. She will RTC sooner with new or worsening urinary issues.     Today she reports   OAB has been better with gemtesa (was able to get it from the pharmacy). Started having spotting around the time she tried leaving the pessary out. Had a hard time getting it back in. This was a couple months ago.    The following were reviewed to gain additional history:  External notes: Dr. Mchugh note 2/28/24 annual visit  Test results: urine culture positive for klebsiella resistant to ampicillin 4/16/24          PHYSICAL EXAMINATION:  No LMP recorded. Patient is postmenopausal.  There is no height or weight on file to calculate BMI.  There were no vitals taken for this visit.    General Appearance: well appearing  Neuro: Alert and oriented   HEENT: mucous membranes moist, neck supple  Resp: No  respiratory distress, normal work of breathing  MSK: normal range of motion, gait appropriate    Pelvic:  Chaperone for pelvic exam:   Genitourinary:  normal external genitalia, Bartholin's glands, [place]'s glands negative,   Urethra normal meatus, non-tender, no periurethral mass  Vaginal mucosa  normal  Adnexae  negative nontender, no masses  Atrophy negative    CST negative    POP-Q (in supine position):  Mild cystocele noted with pessary removal    Rectal: no hemorrhoids, fissures or masses.      IMPRESSION AND PLAN:  Cele Lunsford is a 79 y.o. who presents in follow up for POP, OAB    POP  - pessary removed cleaned and replaced today without difficulty  - exam reassuring, no evidence of bleeding    OAB  - well controlled with gemtesa  - gets refills through Dr. Mchugh    Constipation  - discussed fiber supplementation  - PI sheet given today    UTI concerns  - leaving for vacation for a couple weeks  - rx for macrobid given in case of symptoms  - discussed going forward after vacation recommend culture with each episode of symptoms    All questions and concerns were answered and addressed.  The patient expressed understanding and agrees with the plan.     Sonam Herman MD

## 2024-05-29 ENCOUNTER — APPOINTMENT (OUTPATIENT)
Dept: OBSTETRICS AND GYNECOLOGY | Facility: CLINIC | Age: 79
End: 2024-05-29
Payer: MEDICARE

## 2024-07-08 ENCOUNTER — LAB (OUTPATIENT)
Dept: LAB | Facility: LAB | Age: 79
End: 2024-07-08
Payer: MEDICARE

## 2024-07-08 ENCOUNTER — TELEPHONE (OUTPATIENT)
Dept: HEMATOLOGY/ONCOLOGY | Facility: CLINIC | Age: 79
End: 2024-07-08

## 2024-07-08 DIAGNOSIS — R30.0 DYSURIA: Primary | ICD-10-CM

## 2024-07-08 NOTE — TELEPHONE ENCOUNTER
Cele stopped by office today. Reports returned from vacation, had taken Nitrofurantion while on vacation finished on 6/19/24 for pelvic fullness, pressure. Today continues to report same symptoms, she is not sure if the antibiotic took symptoms away.  She will do urinalysis and culture. Order placed.

## 2024-07-09 ENCOUNTER — TELEPHONE (OUTPATIENT)
Dept: OBSTETRICS AND GYNECOLOGY | Facility: CLINIC | Age: 79
End: 2024-07-09

## 2024-07-09 ENCOUNTER — LAB (OUTPATIENT)
Dept: LAB | Facility: LAB | Age: 79
End: 2024-07-09
Payer: MEDICARE

## 2024-07-09 DIAGNOSIS — R30.0 DYSURIA: Primary | ICD-10-CM

## 2024-07-09 DIAGNOSIS — R30.0 DYSURIA: ICD-10-CM

## 2024-07-09 DIAGNOSIS — N39.0 ACUTE UTI: Primary | ICD-10-CM

## 2024-07-09 LAB
AMORPH CRY #/AREA UR COMP ASSIST: ABNORMAL /HPF
APPEARANCE UR: ABNORMAL
BACTERIA #/AREA URNS AUTO: ABNORMAL /HPF
BILIRUB UR STRIP.AUTO-MCNC: NEGATIVE MG/DL
COLOR UR: YELLOW
GLUCOSE UR STRIP.AUTO-MCNC: NORMAL MG/DL
KETONES UR STRIP.AUTO-MCNC: NEGATIVE MG/DL
LEUKOCYTE ESTERASE UR QL STRIP.AUTO: ABNORMAL
MUCOUS THREADS #/AREA URNS AUTO: ABNORMAL /LPF
NITRITE UR QL STRIP.AUTO: NEGATIVE
PH UR STRIP.AUTO: 7 [PH]
PROT UR STRIP.AUTO-MCNC: NEGATIVE MG/DL
RBC # UR STRIP.AUTO: ABNORMAL /UL
RBC #/AREA URNS AUTO: ABNORMAL /HPF
SP GR UR STRIP.AUTO: 1.01
SQUAMOUS #/AREA URNS AUTO: ABNORMAL /HPF
TRANS CELLS #/AREA UR COMP ASSIST: ABNORMAL /HPF
UROBILINOGEN UR STRIP.AUTO-MCNC: NORMAL MG/DL
WBC #/AREA URNS AUTO: >50 /HPF
WBC CLUMPS #/AREA URNS AUTO: ABNORMAL /HPF

## 2024-07-09 PROCEDURE — 87186 SC STD MICRODIL/AGAR DIL: CPT

## 2024-07-09 PROCEDURE — 87086 URINE CULTURE/COLONY COUNT: CPT

## 2024-07-09 PROCEDURE — 81001 URINALYSIS AUTO W/SCOPE: CPT

## 2024-07-10 RX ORDER — NITROFURANTOIN 25; 75 MG/1; MG/1
100 CAPSULE ORAL 2 TIMES DAILY
Qty: 10 CAPSULE | Refills: 0 | Status: SHIPPED | OUTPATIENT
Start: 2024-07-10 | End: 2024-07-15

## 2024-07-11 LAB — BACTERIA UR CULT: ABNORMAL

## 2024-08-01 ENCOUNTER — TELEPHONE (OUTPATIENT)
Dept: HEMATOLOGY/ONCOLOGY | Facility: CLINIC | Age: 79
End: 2024-08-01

## 2024-08-01 ENCOUNTER — LAB (OUTPATIENT)
Dept: LAB | Facility: LAB | Age: 79
End: 2024-08-01
Payer: MEDICARE

## 2024-08-01 DIAGNOSIS — R30.0 DYSURIA: ICD-10-CM

## 2024-08-01 LAB
APPEARANCE UR: CLEAR
BILIRUB UR STRIP.AUTO-MCNC: NEGATIVE MG/DL
COLOR UR: ABNORMAL
GLUCOSE UR STRIP.AUTO-MCNC: NORMAL MG/DL
KETONES UR STRIP.AUTO-MCNC: NEGATIVE MG/DL
LEUKOCYTE ESTERASE UR QL STRIP.AUTO: ABNORMAL
NITRITE UR QL STRIP.AUTO: NEGATIVE
PH UR STRIP.AUTO: 7 [PH]
PROT UR STRIP.AUTO-MCNC: NEGATIVE MG/DL
RBC # UR STRIP.AUTO: NEGATIVE /UL
RBC #/AREA URNS AUTO: ABNORMAL /HPF
SP GR UR STRIP.AUTO: 1.01
UROBILINOGEN UR STRIP.AUTO-MCNC: NORMAL MG/DL
WBC #/AREA URNS AUTO: ABNORMAL /HPF

## 2024-08-01 PROCEDURE — 87186 SC STD MICRODIL/AGAR DIL: CPT

## 2024-08-01 PROCEDURE — 81001 URINALYSIS AUTO W/SCOPE: CPT

## 2024-08-01 PROCEDURE — 87086 URINE CULTURE/COLONY COUNT: CPT

## 2024-08-01 NOTE — TELEPHONE ENCOUNTER
Cele called office. C/O bearing down sensation again since last night. She is asking for lab order for U/A. Order placed. She reports she has been having recurrent UTI all summer. She admits she probably isn't drinking enough with the heat, and has been taking claritin and a Macular health formula, which could also be causing some dryness. She stopped the Claritin today.

## 2024-08-02 ENCOUNTER — TELEPHONE (OUTPATIENT)
Dept: HEMATOLOGY/ONCOLOGY | Facility: CLINIC | Age: 79
End: 2024-08-02
Payer: MEDICARE

## 2024-08-02 LAB — HOLD SPECIMEN: NORMAL

## 2024-08-04 LAB — BACTERIA UR CULT: ABNORMAL

## 2024-08-06 ENCOUNTER — APPOINTMENT (OUTPATIENT)
Dept: PRIMARY CARE | Facility: CLINIC | Age: 79
End: 2024-08-06
Payer: MEDICARE

## 2024-08-06 VITALS
SYSTOLIC BLOOD PRESSURE: 130 MMHG | BODY MASS INDEX: 29.06 KG/M2 | WEIGHT: 170.2 LBS | OXYGEN SATURATION: 97 % | HEART RATE: 73 BPM | HEIGHT: 64 IN | DIASTOLIC BLOOD PRESSURE: 80 MMHG

## 2024-08-06 DIAGNOSIS — E78.2 MIXED HYPERLIPIDEMIA: ICD-10-CM

## 2024-08-06 DIAGNOSIS — Z00.00 ROUTINE GENERAL MEDICAL EXAMINATION AT HEALTH CARE FACILITY: Primary | ICD-10-CM

## 2024-08-06 DIAGNOSIS — I10 BENIGN ESSENTIAL HTN: ICD-10-CM

## 2024-08-06 DIAGNOSIS — N39.0 ACUTE UTI: ICD-10-CM

## 2024-08-06 DIAGNOSIS — K21.9 GASTROESOPHAGEAL REFLUX DISEASE WITHOUT ESOPHAGITIS: ICD-10-CM

## 2024-08-06 RX ORDER — NITROFURANTOIN 25; 75 MG/1; MG/1
100 CAPSULE ORAL 2 TIMES DAILY
Qty: 10 CAPSULE | Refills: 2 | Status: SHIPPED | OUTPATIENT
Start: 2024-08-06 | End: 2024-08-11

## 2024-08-06 ASSESSMENT — ACTIVITIES OF DAILY LIVING (ADL)
DRESSING: INDEPENDENT
BATHING: INDEPENDENT
MANAGING_FINANCES: INDEPENDENT
TAKING_MEDICATION: INDEPENDENT
DOING_HOUSEWORK: INDEPENDENT
GROCERY_SHOPPING: INDEPENDENT

## 2024-08-06 ASSESSMENT — ENCOUNTER SYMPTOMS
DEPRESSION: 0
LOSS OF SENSATION IN FEET: 0
OCCASIONAL FEELINGS OF UNSTEADINESS: 0

## 2024-08-06 ASSESSMENT — PATIENT HEALTH QUESTIONNAIRE - PHQ9
2. FEELING DOWN, DEPRESSED OR HOPELESS: NOT AT ALL
SUM OF ALL RESPONSES TO PHQ9 QUESTIONS 1 AND 2: 0
1. LITTLE INTEREST OR PLEASURE IN DOING THINGS: NOT AT ALL

## 2024-08-06 NOTE — PROGRESS NOTES
"Subjective   Reason for Visit: Cele Lunsford is an 79 y.o. female here for a Medicare Wellness visit.     Past Medical, Surgical, and Family History reviewed and updated in chart.    Reviewed all medications by prescribing practitioner or clinical pharmacist (such as prescriptions, OTCs, herbal therapies and supplements) and documented in the medical record.    HPI  Since the last office visit there have been no interval operations, hospitalizations, important illnesses or injuries.  HTN-Takes and tolerates meds without side effects. No alcohol. no tobacco. no exercise. low salt.  Reviewed recommendation for 150 minutes of exercise per week including 2 days of weight training if over age 50  GERD-Takes PPI daily with no breakthrough symptoms.  Reviewed dietary, caffeine, tobacco, alcohol, and NSAID avoidance. No dyspepsia, dysphagia, reflux, melena, or abdominal pain.  Hyperlipidemia- is on a statin and a prudent diet.  Acute uti management revoewed ad rx teddyvigeorges Decker working on [employer]     Having some constipation, recc meta  Patient Care Team:  Jimmy Mchugh MD as PCP - General  Jimmy Mchugh MD as PCP [facility]     Review of Systems  General-no fatigue weight to within 10 pounds  ENT no problems with vision swallowing  Cardiac no chest pains palpitations change in exercise tolerance or capacity  Pulmonary no cough shortness of breath  GI no heartburn or abdominal pain  Musculoskeletal no joint pains    Objective   Vitals:  /80   Pulse 73   Ht 1.626 m (5' 4\")   Wt 77.2 kg (170 lb 3.2 oz)   SpO2 97%   BMI 29.21 kg/m²       Physical Exam  General:  Alert, No acute distress. Appears stated age  Eye:  Pupils are equal, round and reactive to light, Extraocular movements are intact, Normal conjunctiva.    Neck:  Supple, Non-tender, No carotid bruit, No jugular venous distention, No lymphadenopathy, No thyromegaly.    Respiratory:  Lungs are clear to auscultation, " Respirations are non-labored, Breath sounds are equal.    Cardiovascular:  Normal rate, Regular rhythm, No murmur.    Gastrointestinal:  Soft, Non-tender, No organomegaly. No solid or pulsatile mass  Integumentary:  Warm, Dry. No concerning lesions on exposed areas  Neurologic:  Alert, Oriented.  Gross and fine motor intact, CN 2-12 intact  Psychiatric:  Cooperative, Appropriate mood & affect.    Assessment/Plan   Problem List Items Addressed This Visit             ICD-10-CM    Benign essential HTN I10    Relevant Orders    CBC    Comprehensive Metabolic Panel    Lipid Panel    Follow Up In Primary Care    Hyperlipemia E78.5    Relevant Orders    CBC    Comprehensive Metabolic Panel    Lipid Panel    Follow Up In Primary Care    Gastroesophageal reflux disease without esophagitis K21.9    Relevant Orders    CBC    Comprehensive Metabolic Panel    Follow Up In Primary Care     Other Visit Diagnoses         Codes    Routine general medical examination at health care facility    -  Primary Z00.00    Relevant Orders    Follow Up In Primary Care    Acute UTI     N39.0    Relevant Medications    nitrofurantoin, macrocrystal-monohydrate, (Macrobid) 100 mg capsule    Other Relevant Orders    Follow Up In Primary Care

## 2024-08-13 ENCOUNTER — TELEPHONE (OUTPATIENT)
Dept: OBSTETRICS AND GYNECOLOGY | Facility: CLINIC | Age: 79
End: 2024-08-13

## 2024-08-13 ENCOUNTER — LAB (OUTPATIENT)
Dept: LAB | Facility: LAB | Age: 79
End: 2024-08-13
Payer: MEDICARE

## 2024-08-13 DIAGNOSIS — R30.0 DYSURIA: ICD-10-CM

## 2024-08-13 DIAGNOSIS — N39.0 ACUTE UTI: Primary | ICD-10-CM

## 2024-08-13 DIAGNOSIS — R30.0 DYSURIA: Primary | ICD-10-CM

## 2024-08-13 LAB
APPEARANCE UR: ABNORMAL
BACTERIA #/AREA URNS AUTO: ABNORMAL /HPF
BILIRUB UR STRIP.AUTO-MCNC: NEGATIVE MG/DL
COLOR UR: YELLOW
GLUCOSE UR STRIP.AUTO-MCNC: NORMAL MG/DL
KETONES UR STRIP.AUTO-MCNC: NEGATIVE MG/DL
LEUKOCYTE ESTERASE UR QL STRIP.AUTO: ABNORMAL
NITRITE UR QL STRIP.AUTO: NEGATIVE
PH UR STRIP.AUTO: 7 [PH]
PROT UR STRIP.AUTO-MCNC: ABNORMAL MG/DL
RBC # UR STRIP.AUTO: ABNORMAL /UL
RBC #/AREA URNS AUTO: ABNORMAL /HPF
SP GR UR STRIP.AUTO: 1.01
SQUAMOUS #/AREA URNS AUTO: ABNORMAL /HPF
UROBILINOGEN UR STRIP.AUTO-MCNC: NORMAL MG/DL
WBC #/AREA URNS AUTO: >50 /HPF
WBC CLUMPS #/AREA URNS AUTO: ABNORMAL /HPF

## 2024-08-13 PROCEDURE — 87186 SC STD MICRODIL/AGAR DIL: CPT

## 2024-08-13 PROCEDURE — 81001 URINALYSIS AUTO W/SCOPE: CPT

## 2024-08-13 PROCEDURE — 87086 URINE CULTURE/COLONY COUNT: CPT

## 2024-08-16 ENCOUNTER — TELEPHONE (OUTPATIENT)
Dept: HEMATOLOGY/ONCOLOGY | Facility: CLINIC | Age: 79
End: 2024-08-16
Payer: MEDICARE

## 2024-08-16 DIAGNOSIS — N39.0 RECURRENT UTI: Primary | ICD-10-CM

## 2024-08-16 DIAGNOSIS — N95.2 VAGINAL ATROPHY: Primary | ICD-10-CM

## 2024-08-16 LAB
BACTERIA UR CULT: ABNORMAL
BACTERIA UR CULT: ABNORMAL

## 2024-08-16 RX ORDER — SULFAMETHOXAZOLE AND TRIMETHOPRIM 800; 160 MG/1; MG/1
1 TABLET ORAL 2 TIMES DAILY
Qty: 10 TABLET | Refills: 0 | Status: SHIPPED | OUTPATIENT
Start: 2024-08-16 | End: 2024-08-21

## 2024-08-16 RX ORDER — SULFAMETHOXAZOLE AND TRIMETHOPRIM 800; 160 MG/1; MG/1
1 TABLET ORAL 2 TIMES DAILY
Qty: 10 TABLET | Refills: 0 | Status: SHIPPED | OUTPATIENT
Start: 2024-08-16 | End: 2024-08-16

## 2024-08-16 RX ORDER — ESTRADIOL 0.1 MG/G
CREAM VAGINAL
Qty: 42.5 G | Refills: 2 | Status: SHIPPED | OUTPATIENT
Start: 2024-08-16

## 2024-08-16 RX ORDER — ESTRADIOL 0.1 MG/G
CREAM VAGINAL
Qty: 42.5 G | Refills: 2 | Status: SHIPPED | OUTPATIENT
Start: 2024-08-16 | End: 2024-08-16

## 2024-08-16 NOTE — TELEPHONE ENCOUNTER
Patient called office stating the RX for  Bactrim and estrogen were sent to the wrong pharmacy, they were sent to [facility] and should be sent to [facility] in [place]. Would you be able to send RX to [facility]. Thank you.

## 2024-08-22 ENCOUNTER — APPOINTMENT (OUTPATIENT)
Age: 79
End: 2024-08-22
Payer: MEDICARE

## 2024-10-10 ENCOUNTER — OFFICE VISIT (OUTPATIENT)
Dept: OBSTETRICS AND GYNECOLOGY | Facility: CLINIC | Age: 79
End: 2024-10-10
Payer: MEDICARE

## 2024-10-10 VITALS
TEMPERATURE: 96.8 F | RESPIRATION RATE: 16 BRPM | OXYGEN SATURATION: 98 % | BODY MASS INDEX: 28.7 KG/M2 | SYSTOLIC BLOOD PRESSURE: 157 MMHG | DIASTOLIC BLOOD PRESSURE: 95 MMHG | HEART RATE: 90 BPM | WEIGHT: 167.2 LBS

## 2024-10-10 DIAGNOSIS — R30.0 DYSURIA: ICD-10-CM

## 2024-10-10 DIAGNOSIS — N32.81 OAB (OVERACTIVE BLADDER): ICD-10-CM

## 2024-10-10 DIAGNOSIS — N39.0 RECURRENT UTI: ICD-10-CM

## 2024-10-10 DIAGNOSIS — Z46.89 PESSARY MAINTENANCE: Primary | ICD-10-CM

## 2024-10-10 LAB
APPEARANCE UR: CLEAR
BILIRUB UR STRIP.AUTO-MCNC: NEGATIVE MG/DL
COLOR UR: YELLOW
GLUCOSE UR STRIP.AUTO-MCNC: NORMAL MG/DL
KETONES UR STRIP.AUTO-MCNC: NEGATIVE MG/DL
LEUKOCYTE ESTERASE UR QL STRIP.AUTO: ABNORMAL
MUCOUS THREADS #/AREA URNS AUTO: ABNORMAL /LPF
NITRITE UR QL STRIP.AUTO: NEGATIVE
PH UR STRIP.AUTO: 6.5 [PH]
PROT UR STRIP.AUTO-MCNC: ABNORMAL MG/DL
RBC # UR STRIP.AUTO: ABNORMAL /UL
RBC #/AREA URNS AUTO: ABNORMAL /HPF
SP GR UR STRIP.AUTO: 1.02
SQUAMOUS #/AREA URNS AUTO: ABNORMAL /HPF
UROBILINOGEN UR STRIP.AUTO-MCNC: NORMAL MG/DL
WBC #/AREA URNS AUTO: ABNORMAL /HPF

## 2024-10-10 PROCEDURE — 99214 OFFICE O/P EST MOD 30 MIN: CPT | Performed by: STUDENT IN AN ORGANIZED HEALTH CARE EDUCATION/TRAINING PROGRAM

## 2024-10-10 PROCEDURE — 17250 CHEM CAUT OF GRANLTJ TISSUE: CPT | Performed by: STUDENT IN AN ORGANIZED HEALTH CARE EDUCATION/TRAINING PROGRAM

## 2024-10-10 PROCEDURE — 87086 URINE CULTURE/COLONY COUNT: CPT | Mod: SAMLAB | Performed by: STUDENT IN AN ORGANIZED HEALTH CARE EDUCATION/TRAINING PROGRAM

## 2024-10-10 PROCEDURE — 81001 URINALYSIS AUTO W/SCOPE: CPT | Performed by: STUDENT IN AN ORGANIZED HEALTH CARE EDUCATION/TRAINING PROGRAM

## 2024-10-10 RX ORDER — ESTRADIOL 0.1 MG/G
CREAM VAGINAL
Qty: 42.5 G | Refills: 2 | Status: SHIPPED | OUTPATIENT
Start: 2024-10-10

## 2024-10-10 ASSESSMENT — PAIN SCALES - GENERAL: PAINLEVEL: 0-NO PAIN

## 2024-10-10 NOTE — PROGRESS NOTES
HISTORY OF PRESENT ILLNESS:  Cele Lunsford is a 79 y.o. female, who presents in follow up for POP, OAB, UTI concerns.     During last encounter on 4/23/24, reviewed and agreed to the following:  Cele Lunsford is a 79 y.o. who presents in follow up for POP, OAB     POP  - pessary removed cleaned and replaced today without difficulty  - exam reassuring, no evidence of bleeding     OAB  - well controlled with gemtesa  - gets refills through Dr. Mchugh     Constipation  - discussed fiber supplementation  - PI sheet given today     UTI concerns  - leaving for vacation for a couple weeks  - rx for macrobid given in case of symptoms  - discussed going forward after vacation recommend culture with each episode of symptoms     All questions and concerns were answered and addressed.  The patient expressed understanding and agrees with the plan.      Sonam Herman MD    Today she reports   Felt nauseous and rash under her breasts. Feels these are usual UTI symptoms for her. Had cipro from 2018 and took complete 10 day course.     Has had spotting for a while. Unsure if it's from the pessary. Has foul odor from pessary. Taking out twice per month.     Not using vaginal estrogen cream. Was not comfortable with side effects    The following were reviewed to gain additional history:  External notes: Dr. Mchugh 8/6/24, gemtesa working well  Test results:   Lab Results   Component Value Date    URINECULTURE >100,000 Klebsiella pneumoniae/variicola (A) 08/13/2024    URINECULTURE >100,000 Escherichia coli (A) 08/13/2024    URINECULTURE >100,000 Escherichia coli (A) 08/01/2024    URINECULTURE 20,000 - 80,000 Escherichia coli (A) 07/09/2024    URINECULTURE >100,000 Klebsiella pneumoniae/variicola (A) 04/16/2024               PHYSICAL EXAMINATION:  No LMP recorded. Patient is postmenopausal.  There is no height or weight on file to calculate BMI.  There were no vitals taken for this visit.    General Appearance: well  appearing  Neuro: Alert and oriented   HEENT: mucous membranes moist, neck supple  Resp: No respiratory distress, normal work of breathing  MSK: normal range of motion, gait appropriate    Pelvic:  Chaperone for pelvic exam:   Genitourinary:  normal external genitalia, Bartholin's glands, [place]'s glands negative,   Urethra caruncle, non-tender, no periurethral mass  Vaginal mucosa  normal  Atrophy negative    CST negative      Rectal: no hemorrhoids, fissures or masses.    Pessary removed and replaced without difficulty  Prolapse unchanged    PVR (by ultrasound):       IMPRESSION AND PLAN:  Cele Lunsford is a 79 y.o. who presents in follow up for POP, OAB, Paty.     Pessary maintenance  -  continue home pessary maintenance, #5 ring with support pessary  - pessary maintenance visit performed today, exam as above  - area of granulation treated with silver nitrate  - advised every two weeks pessary maintenance at home and stressed importance of estrogen cream to prevent erosions; reviewed safety data    OAB  - previously tried trospium (constipation), oxybutynin (did not help symptoms), myrbetriq (worked for four weeks only)  -  well controlled with gemtesa, continue    Recurrent UTI  - now with three positive cultures in past 6 months  - reviewed anatomical and physiological risk factors for developing UTI (female anatomy, post-menopausal status)  - counseled on definition of UTI per IDSA guidelines: positive culture in setting of typical symptoms  - reviewed that goal is prevention, to avoid antibiotics if possible  - discussed prevention strategies: vaginal estrogen (counseled that this is the most effective option), methenamine course for 6-12 months, D-mannose, cranberry (36 mg daily of PACs)  -  rx for vaginal estrogen, twice weekly per vagina sent to preferred pharmacy and reviewed instructions for insertion (advised against using applicator)  - reviewed importance of obtaining culture data with each  episode of symptoms: counseled on risk of bacterial antibiotic resistance, and inappropriate treatment if diagnosis is uncertain      All questions and concerns were answered and addressed.  The patient expressed understanding and agrees with the plan.     6 month follow up, Next annual exam 10/2025    Sonam Herman MD

## 2024-10-11 LAB — HOLD SPECIMEN: NORMAL

## 2024-10-12 LAB — BACTERIA UR CULT: NORMAL

## 2024-11-04 ENCOUNTER — TELEPHONE (OUTPATIENT)
Age: 79
End: 2024-11-04
Payer: MEDICARE

## 2024-11-04 DIAGNOSIS — R05.1 ACUTE COUGH: Primary | ICD-10-CM

## 2024-11-04 RX ORDER — AZITHROMYCIN 250 MG/1
TABLET, FILM COATED ORAL
Qty: 6 TABLET | Refills: 0 | Status: SHIPPED | OUTPATIENT
Start: 2024-11-04 | End: 2024-11-05 | Stop reason: SDUPTHER

## 2024-11-04 NOTE — TELEPHONE ENCOUNTER
PT states she is not feeling well, has a cough, congestion and Headache. Covid negative and would like a zpack sent to [facility]

## 2024-11-05 DIAGNOSIS — R05.1 ACUTE COUGH: ICD-10-CM

## 2024-11-05 RX ORDER — AZITHROMYCIN 250 MG/1
TABLET, FILM COATED ORAL
Qty: 6 TABLET | Refills: 0 | Status: SHIPPED | OUTPATIENT
Start: 2024-11-05

## 2025-02-06 ENCOUNTER — APPOINTMENT (OUTPATIENT)
Age: 80
End: 2025-02-06
Payer: MEDICARE

## 2025-02-15 LAB
ALBUMIN SERPL-MCNC: 4.7 G/DL (ref 3.6–5.1)
ALP SERPL-CCNC: 60 U/L (ref 37–153)
ALT SERPL-CCNC: 25 U/L (ref 6–29)
ANION GAP SERPL CALCULATED.4IONS-SCNC: 8 MMOL/L (CALC) (ref 7–17)
AST SERPL-CCNC: 26 U/L (ref 10–35)
BILIRUB SERPL-MCNC: 1.1 MG/DL (ref 0.2–1.2)
BUN SERPL-MCNC: 16 MG/DL (ref 7–25)
CALCIUM SERPL-MCNC: 9.6 MG/DL (ref 8.6–10.4)
CHLORIDE SERPL-SCNC: 98 MMOL/L (ref 98–110)
CHOLEST SERPL-MCNC: 181 MG/DL
CHOLEST/HDLC SERPL: 2.4 (CALC)
CO2 SERPL-SCNC: 31 MMOL/L (ref 20–32)
CREAT SERPL-MCNC: 0.6 MG/DL (ref 0.6–0.95)
EGFRCR SERPLBLD CKD-EPI 2021: 91 ML/MIN/1.73M2
ERYTHROCYTE [DISTWIDTH] IN BLOOD BY AUTOMATED COUNT: 11.7 % (ref 11–15)
GLUCOSE SERPL-MCNC: 104 MG/DL (ref 65–99)
HCT VFR BLD AUTO: 48.1 % (ref 35–45)
HDLC SERPL-MCNC: 75 MG/DL
HGB BLD-MCNC: 16 G/DL (ref 11.7–15.5)
LDLC SERPL CALC-MCNC: 86 MG/DL (CALC)
MCH RBC QN AUTO: 31 PG (ref 27–33)
MCHC RBC AUTO-ENTMCNC: 33.3 G/DL (ref 32–36)
MCV RBC AUTO: 93.2 FL (ref 80–100)
NONHDLC SERPL-MCNC: 106 MG/DL (CALC)
PLATELET # BLD AUTO: 257 THOUSAND/UL (ref 140–400)
PMV BLD REES-ECKER: 10 FL (ref 7.5–12.5)
POTASSIUM SERPL-SCNC: 5.1 MMOL/L (ref 3.5–5.3)
PROT SERPL-MCNC: 7 G/DL (ref 6.1–8.1)
RBC # BLD AUTO: 5.16 MILLION/UL (ref 3.8–5.1)
SODIUM SERPL-SCNC: 137 MMOL/L (ref 135–146)
TRIGL SERPL-MCNC: 106 MG/DL
WBC # BLD AUTO: 5.8 THOUSAND/UL (ref 3.8–10.8)

## 2025-02-21 ENCOUNTER — APPOINTMENT (OUTPATIENT)
Age: 80
End: 2025-02-21
Payer: MEDICARE

## 2025-02-21 VITALS
DIASTOLIC BLOOD PRESSURE: 80 MMHG | SYSTOLIC BLOOD PRESSURE: 140 MMHG | HEART RATE: 75 BPM | OXYGEN SATURATION: 100 % | BODY MASS INDEX: 27.64 KG/M2 | WEIGHT: 161 LBS

## 2025-02-21 DIAGNOSIS — K21.9 GASTROESOPHAGEAL REFLUX DISEASE WITHOUT ESOPHAGITIS: ICD-10-CM

## 2025-02-21 DIAGNOSIS — J31.0 CHRONIC RHINITIS: ICD-10-CM

## 2025-02-21 DIAGNOSIS — I10 HYPERTENSION, UNSPECIFIED TYPE: ICD-10-CM

## 2025-02-21 DIAGNOSIS — Z00.00 ROUTINE GENERAL MEDICAL EXAMINATION AT HEALTH CARE FACILITY: ICD-10-CM

## 2025-02-21 DIAGNOSIS — E78.2 MIXED HYPERLIPIDEMIA: ICD-10-CM

## 2025-02-21 DIAGNOSIS — N39.0 ACUTE UTI: ICD-10-CM

## 2025-02-21 DIAGNOSIS — I10 BENIGN ESSENTIAL HTN: Primary | ICD-10-CM

## 2025-02-21 DIAGNOSIS — E78.5 HYPERLIPIDEMIA, UNSPECIFIED HYPERLIPIDEMIA TYPE: ICD-10-CM

## 2025-02-21 DIAGNOSIS — R21 RASH: ICD-10-CM

## 2025-02-21 DIAGNOSIS — K21.9 GASTROESOPHAGEAL REFLUX DISEASE, UNSPECIFIED WHETHER ESOPHAGITIS PRESENT: ICD-10-CM

## 2025-02-21 DIAGNOSIS — R60.0 PERIPHERAL EDEMA: ICD-10-CM

## 2025-02-21 DIAGNOSIS — G45.9 TIA (TRANSIENT ISCHEMIC ATTACK): ICD-10-CM

## 2025-02-21 LAB
POC APPEARANCE, URINE: ABNORMAL
POC BILIRUBIN, URINE: NEGATIVE
POC BLOOD, URINE: ABNORMAL
POC COLOR, URINE: YELLOW
POC GLUCOSE, URINE: NEGATIVE MG/DL
POC KETONES, URINE: NEGATIVE MG/DL
POC LEUKOCYTES, URINE: ABNORMAL
POC NITRITE,URINE: POSITIVE
POC PH, URINE: 7.5 PH
POC PROTEIN, URINE: NEGATIVE MG/DL
POC SPECIFIC GRAVITY, URINE: 1.02
POC UROBILINOGEN, URINE: 0.2 EU/DL

## 2025-02-21 PROCEDURE — 3077F SYST BP >= 140 MM HG: CPT | Performed by: FAMILY MEDICINE

## 2025-02-21 PROCEDURE — 3079F DIAST BP 80-89 MM HG: CPT | Performed by: FAMILY MEDICINE

## 2025-02-21 PROCEDURE — 1159F MED LIST DOCD IN RCRD: CPT | Performed by: FAMILY MEDICINE

## 2025-02-21 PROCEDURE — 1036F TOBACCO NON-USER: CPT | Performed by: FAMILY MEDICINE

## 2025-02-21 PROCEDURE — 1160F RVW MEDS BY RX/DR IN RCRD: CPT | Performed by: FAMILY MEDICINE

## 2025-02-21 PROCEDURE — 99214 OFFICE O/P EST MOD 30 MIN: CPT | Performed by: FAMILY MEDICINE

## 2025-02-21 PROCEDURE — 81003 URINALYSIS AUTO W/O SCOPE: CPT | Performed by: FAMILY MEDICINE

## 2025-02-21 RX ORDER — FLUTICASONE PROPIONATE 50 MCG
2 SPRAY, SUSPENSION (ML) NASAL DAILY
Qty: 48 G | Refills: 3 | Status: SHIPPED | OUTPATIENT
Start: 2025-02-21 | End: 2026-02-21

## 2025-02-21 RX ORDER — ROSUVASTATIN CALCIUM 10 MG/1
10 TABLET, COATED ORAL DAILY
Qty: 90 TABLET | Refills: 3 | Status: SHIPPED | OUTPATIENT
Start: 2025-02-21 | End: 2026-02-21

## 2025-02-21 RX ORDER — SULFAMETHOXAZOLE AND TRIMETHOPRIM 800; 160 MG/1; MG/1
1 TABLET ORAL 2 TIMES DAILY
Qty: 14 TABLET | Refills: 2 | Status: SHIPPED | OUTPATIENT
Start: 2025-02-21

## 2025-02-21 RX ORDER — PANTOPRAZOLE SODIUM 40 MG/1
40 TABLET, DELAYED RELEASE ORAL DAILY
Qty: 90 TABLET | Refills: 3 | Status: SHIPPED | OUTPATIENT
Start: 2025-02-21 | End: 2026-02-21

## 2025-02-21 RX ORDER — TRIAMCINOLONE ACETONIDE 1 MG/G
CREAM TOPICAL 2 TIMES DAILY
Qty: 80 G | Refills: 11 | Status: SHIPPED | OUTPATIENT
Start: 2025-02-21

## 2025-02-21 RX ORDER — VIBEGRON 75 MG/1
75 TABLET, FILM COATED ORAL DAILY
Qty: 90 TABLET | Refills: 3 | Status: SHIPPED | OUTPATIENT
Start: 2025-02-21 | End: 2026-02-21

## 2025-02-21 RX ORDER — SULFAMETHOXAZOLE AND TRIMETHOPRIM 800; 160 MG/1; MG/1
1 TABLET ORAL 2 TIMES DAILY
Qty: 14 TABLET | Refills: 2 | Status: SHIPPED | OUTPATIENT
Start: 2025-02-21 | End: 2025-02-21 | Stop reason: SDUPTHER

## 2025-02-21 RX ORDER — ASPIRIN 81 MG/1
81 TABLET ORAL DAILY
Qty: 90 TABLET | Refills: 3 | Status: SHIPPED | OUTPATIENT
Start: 2025-02-21 | End: 2026-02-21

## 2025-02-21 RX ORDER — ATENOLOL 50 MG/1
50 TABLET ORAL DAILY
Qty: 90 TABLET | Refills: 3 | Status: SHIPPED | OUTPATIENT
Start: 2025-02-21 | End: 2026-02-21

## 2025-02-21 RX ORDER — HYDROCHLOROTHIAZIDE 25 MG/1
25 TABLET ORAL DAILY
Qty: 90 TABLET | Refills: 3 | Status: SHIPPED | OUTPATIENT
Start: 2025-02-21 | End: 2026-02-21

## 2025-02-21 NOTE — PROGRESS NOTES
Subjective   Patient ID: Cele Lunsford is a 80 y.o. female who presents for Follow-up (6 mo rev labs).    HPI   Since the last office visit there have been no interval operations, hospitalizations, important illnesses or injuries.  Sees dr Herman for uti and pessary  HTN-Takes and tolerates meds without side effects. No alcohol. no tobacco. no exercise. low salt.  Reviewed recommendation for 150 minutes of exercise per week including 2 days of weight training if over age 50  Hyperlipidemia- is on a statin and a prudent diet.  GERD-Takes PPI daily with no breakthrough symptoms.  Reviewed dietary, caffeine, tobacco, alcohol, and NSAID avoidance. No dyspepsia, dysphagia, reflux, melena, or abdominal pain.  UTI management was discussed.  She has difficulty knowing whether she has a UTI or not.  Appears that she does have 1 today and we will send for culture.  Empiric treatment provided Bactrim DS  Rash maintained on triamcinolone  Edema  Tia-no further episodes continuing on aspirin 81    Review of Systems  General-no fatigue weight to within 10 pounds  ENT no problems with vision swallowing  Cardiac no chest pains palpitations change in exercise tolerance or capacity  Pulmonary no cough shortness of breath  GI no heartburn or abdominal pain  Musculoskeletal no joint pains    Objective   /80   Pulse 75   Wt 73 kg (161 lb)   SpO2 100%   BMI 27.64 kg/m²     Physical Exam  General:  Alert, No acute distress. Appears stated age  Eye:  Pupils are equal, round and reactive to light, Extraocular movements are intact, Normal conjunctiva.    Neck:  Supple, Non-tender, No carotid bruit, No jugular venous distention, No lymphadenopathy, No thyromegaly.    Respiratory:  Lungs are clear to auscultation, Respirations are non-labored, Breath sounds are equal.    Cardiovascular:  Normal rate, Regular rhythm, No murmur.    Gastrointestinal:  Soft, Non-tender, No organomegaly. No solid or pulsatile mass  Integumentary:   Warm, Dry. No concerning lesions on exposed areas  Neurologic:  Alert, Oriented.  Gross and fine motor intact, CN 2-12 intact  Psychiatric:  Cooperative, Appropriate mood & affect.    Assessment/Plan   Problem List Items Addressed This Visit             ICD-10-CM    TIA (transient ischemic attack) G45.9    Relevant Medications    aspirin 81 mg EC tablet    Other Relevant Orders    Follow Up In Primary Care    Benign essential HTN - Primary I10    Relevant Orders    Follow Up In Primary Care    Hyperlipemia E78.5    Relevant Medications    rosuvastatin (Crestor) 10 mg tablet    vibegron (Gemtesa) 75 mg tablet    Other Relevant Orders    Follow Up In Primary Care    Follow Up In Primary Care    Gastroesophageal reflux disease without esophagitis K21.9    Relevant Orders    Follow Up In Primary Care     Other Visit Diagnoses         Codes    Acute UTI     N39.0    Relevant Medications    sulfamethoxazole-trimethoprim (Bactrim DS) 800-160 mg tablet    Other Relevant Orders    Follow Up In Primary Care    POCT UA Automated manually resulted (Completed)    Urine Culture    Hypertension, unspecified type     I10    Relevant Medications    atenolol (Tenormin) 50 mg tablet    hydroCHLOROthiazide (HYDRODiuril) 25 mg tablet    Other Relevant Orders    Follow Up In Primary Care    Routine general medical examination at health care facility     Z00.00    Relevant Orders    Follow Up In Primary Care    Chronic rhinitis     J31.0    Relevant Medications    fluticasone (Flonase) 50 mcg/actuation nasal spray    Other Relevant Orders    Follow Up In Primary Care    Peripheral edema     R60.0    Relevant Medications    hydroCHLOROthiazide (HYDRODiuril) 25 mg tablet    Other Relevant Orders    Follow Up In Primary Care    Gastroesophageal reflux disease, unspecified whether esophagitis present     K21.9    Relevant Medications    pantoprazole (ProtoNix) 40 mg EC tablet    Other Relevant Orders    Follow Up In Primary Care    Rash      R21    Relevant Medications    triamcinolone (Kenalog) 0.1 % cream    Other Relevant Orders    Follow Up In Primary Care

## 2025-02-23 LAB — BACTERIA UR CULT: ABNORMAL

## 2025-02-24 LAB — BACTERIA UR CULT: ABNORMAL

## 2025-02-25 ENCOUNTER — TELEPHONE (OUTPATIENT)
Age: 80
End: 2025-02-25
Payer: MEDICARE

## 2025-02-25 NOTE — TELEPHONE ENCOUNTER
----- Message from Jimmy Mchugh sent at 2/24/2025  6:35 PM EST -----  Urine culture was positive and the trimethoprim sulfa should be effective    Pt notified.

## 2025-04-07 ENCOUNTER — APPOINTMENT (OUTPATIENT)
Age: 80
End: 2025-04-07
Payer: MEDICARE

## 2025-04-07 VITALS
DIASTOLIC BLOOD PRESSURE: 84 MMHG | OXYGEN SATURATION: 98 % | BODY MASS INDEX: 28.15 KG/M2 | WEIGHT: 164 LBS | SYSTOLIC BLOOD PRESSURE: 140 MMHG | HEART RATE: 74 BPM

## 2025-04-07 DIAGNOSIS — M17.0 PRIMARY OSTEOARTHRITIS OF BOTH KNEES: Primary | ICD-10-CM

## 2025-04-07 PROCEDURE — 99213 OFFICE O/P EST LOW 20 MIN: CPT | Performed by: FAMILY MEDICINE

## 2025-04-07 PROCEDURE — 1036F TOBACCO NON-USER: CPT | Performed by: FAMILY MEDICINE

## 2025-04-07 PROCEDURE — 1159F MED LIST DOCD IN RCRD: CPT | Performed by: FAMILY MEDICINE

## 2025-04-07 PROCEDURE — G2211 COMPLEX E/M VISIT ADD ON: HCPCS | Performed by: FAMILY MEDICINE

## 2025-04-07 PROCEDURE — 3077F SYST BP >= 140 MM HG: CPT | Performed by: FAMILY MEDICINE

## 2025-04-07 PROCEDURE — 3079F DIAST BP 80-89 MM HG: CPT | Performed by: FAMILY MEDICINE

## 2025-04-07 PROCEDURE — 1160F RVW MEDS BY RX/DR IN RCRD: CPT | Performed by: FAMILY MEDICINE

## 2025-04-07 RX ORDER — DICLOFENAC SODIUM 100 MG/1
100 TABLET, EXTENDED RELEASE ORAL DAILY
Qty: 14 TABLET | Refills: 0 | Status: SHIPPED | OUTPATIENT
Start: 2025-04-07

## 2025-04-07 NOTE — PROGRESS NOTES
"Subjective   Patient ID: Cele Lunsford is a 80 y.o. female who presents for Knee Pain (right).    HPI   Was shopping Tuesday and noticed some right knee/popliteal pain with ambulation, \"like I pulled something behind my leg\". Reports some swelling, has been using ice and NSAIDs.   Does have history of OA with steroid injection to left knee in 2021. Denies falls, recent trauma, or previous knee surgeries.    Left knee 110-120 deg, right knee 100 deg    Review of Systems  Knee pain as per HPI  Objective   /84   Pulse 74   Wt 74.4 kg (164 lb)   SpO2 98%   BMI 28.15 kg/m²     Physical Exam  Left 110 right 100  Assessment/Plan   Problem List Items Addressed This Visit    None  Visit Diagnoses         Codes    Primary osteoarthritis of both knees    -  Primary M17.0    Relevant Medications    diclofenac sodium (Voltaren XR) 100 mg 24 hr tablet             Diclofenac x 14 d if not better get injection  " - - -

## 2025-04-08 ENCOUNTER — TELEPHONE (OUTPATIENT)
Age: 80
End: 2025-04-08
Payer: MEDICARE

## 2025-04-22 NOTE — PROGRESS NOTES
HISTORY OF PRESENT ILLNESS:  Cele Lunsford is a 80 y.o. female, who presents in follow up for POP, OAB, Paty     During last encounter on 10/10/24, reviewed and agreed to the following:  Cele Lunsford is a 79 y.o. who presents in follow up for POP, OAB, Paty.      Pessary maintenance  -  continue home pessary maintenance, #5 ring with support pessary  - pessary maintenance visit performed today, exam as above  - area of granulation treated with silver nitrate  - advised every two weeks pessary maintenance at home and stressed importance of estrogen cream to prevent erosions; reviewed safety data     OAB  - previously tried trospium (constipation), oxybutynin (did not help symptoms), myrbetriq (worked for four weeks only)  -  well controlled with gemtesa, continue     Recurrent UTI  - now with three positive cultures in past 6 months  - reviewed anatomical and physiological risk factors for developing UTI (female anatomy, post-menopausal status)  - counseled on definition of UTI per IDSA guidelines: positive culture in setting of typical symptoms  - reviewed that goal is prevention, to avoid antibiotics if possible  - discussed prevention strategies: vaginal estrogen (counseled that this is the most effective option), methenamine course for 6-12 months, D-mannose, cranberry (36 mg daily of PACs)  -  rx for vaginal estrogen, twice weekly per vagina sent to preferred pharmacy and reviewed instructions for insertion (advised against using applicator)  - reviewed importance of obtaining culture data with each episode of symptoms: counseled on risk of bacterial antibiotic resistance, and inappropriate treatment if diagnosis is uncertain        All questions and concerns were answered and addressed.  The patient expressed understanding and agrees with the plan.      6 month follow up, Next annual exam 10/2025    Today she reports   Had a rough year, was very anxious coming into this appointment. Still using  pessary. Bleeding more than she did with smaller one. Taking it out once per month. Using estrogen twice weekly.     Takes gemtesa. Feels she is dry.     She has several concerns today. Unclear what her goals are for the visit.    UTI symptoms are very unclear. Symptoms are typically urine appearance or odor.     The following were reviewed to gain additional history:  External notes: Dr. Mchugh PCP note, arthritis bilateral knees  Test results:   Lab Results   Component Value Date    URINECULTURE SEE NOTE (A) 02/21/2025    URINECULTURE Normal genitourinary jonelle 10/10/2024    URINECULTURE >100,000 Klebsiella pneumoniae/variicola (A) 08/13/2024    URINECULTURE >100,000 Escherichia coli (A) 08/13/2024    URINECULTURE >100,000 Escherichia coli (A) 08/01/2024               PHYSICAL EXAMINATION:  No LMP recorded. Patient is postmenopausal.  Body mass index is 27.88 kg/m².  BP (!) 168/100   Pulse 75   Temp 36.2 °C (97.2 °F) (Skin)   Resp 16   Wt 73.7 kg (162 lb 6.4 oz)   SpO2 99%   BMI 27.88 kg/m²     General Appearance: well appearing  Neuro: Alert and oriented   HEENT: mucous membranes moist, neck supple  Resp: No respiratory distress, normal work of breathing  MSK: normal range of motion, gait appropriate    Pelvic:  Chaperone for pelvic exam:   Genitourinary:  normal external genitalia, Bartholin's glands, [place]'s glands negative,   Urethra normal meatus, non-tender, no periurethral mass  Vaginal mucosa  normal  Atrophy negative    CST negative    POP-Q (in supine position):  Unchanged from prior    Rectal: no hemorrhoids, fissures or masses.    PVR (by ultrasound):       IMPRESSION AND PLAN:  Cele Lunsford is a 80 y.o. who presents in follow up for POP, OAB, Paty     POP  - continue pessary maintenance  - small area of abrasion noted but not actively bleeding  - recommended increasing home maintenance to twice per month    OAB  - unclear if gemtesa is helping her  - discussed she can take it at  nighttime if she prefers  - also discussed she could try a medication holiday and see how she does    Paty  - again extensively counseled on recommendations for managing and evaluating UTIs  - advised she is welcome to follow with Dr. Mchugh or me but that I will only treat UTIs with classic symptoms    RTC 1 year for follow up    All questions and concerns were answered and addressed.  The patient expressed understanding and agrees with the plan.     Sonam Herman MD

## 2025-04-23 ENCOUNTER — APPOINTMENT (OUTPATIENT)
Dept: OBSTETRICS AND GYNECOLOGY | Facility: CLINIC | Age: 80
End: 2025-04-23
Payer: MEDICARE

## 2025-04-24 ENCOUNTER — APPOINTMENT (OUTPATIENT)
Dept: OBSTETRICS AND GYNECOLOGY | Facility: CLINIC | Age: 80
End: 2025-04-24
Payer: MEDICARE

## 2025-04-24 VITALS
DIASTOLIC BLOOD PRESSURE: 100 MMHG | OXYGEN SATURATION: 99 % | WEIGHT: 162.4 LBS | TEMPERATURE: 97.2 F | BODY MASS INDEX: 27.88 KG/M2 | HEART RATE: 75 BPM | SYSTOLIC BLOOD PRESSURE: 168 MMHG | RESPIRATION RATE: 16 BRPM

## 2025-04-24 DIAGNOSIS — N39.0 RECURRENT UTI: Primary | ICD-10-CM

## 2025-04-24 DIAGNOSIS — R30.0 DYSURIA: ICD-10-CM

## 2025-04-24 DIAGNOSIS — Z46.89 PESSARY MAINTENANCE: ICD-10-CM

## 2025-04-24 DIAGNOSIS — N32.81 OAB (OVERACTIVE BLADDER): ICD-10-CM

## 2025-04-24 PROCEDURE — 99214 OFFICE O/P EST MOD 30 MIN: CPT | Performed by: STUDENT IN AN ORGANIZED HEALTH CARE EDUCATION/TRAINING PROGRAM

## 2025-04-24 PROCEDURE — 1125F AMNT PAIN NOTED PAIN PRSNT: CPT | Performed by: STUDENT IN AN ORGANIZED HEALTH CARE EDUCATION/TRAINING PROGRAM

## 2025-04-24 PROCEDURE — G2211 COMPLEX E/M VISIT ADD ON: HCPCS | Performed by: STUDENT IN AN ORGANIZED HEALTH CARE EDUCATION/TRAINING PROGRAM

## 2025-04-24 PROCEDURE — 3077F SYST BP >= 140 MM HG: CPT | Performed by: STUDENT IN AN ORGANIZED HEALTH CARE EDUCATION/TRAINING PROGRAM

## 2025-04-24 PROCEDURE — 3080F DIAST BP >= 90 MM HG: CPT | Performed by: STUDENT IN AN ORGANIZED HEALTH CARE EDUCATION/TRAINING PROGRAM

## 2025-04-24 ASSESSMENT — PAIN SCALES - GENERAL: PAINLEVEL_OUTOF10: 4

## 2025-04-26 LAB
APPEARANCE UR: ABNORMAL
BACTERIA #/AREA URNS HPF: ABNORMAL /HPF
BACTERIA UR CULT: ABNORMAL
BACTERIA UR CULT: ABNORMAL
BILIRUB UR QL STRIP: NEGATIVE
CAOX CRY #/AREA URNS HPF: ABNORMAL /HPF
CASTS #/AREA URNS LPF: ABNORMAL /LPF
COLOR UR: YELLOW
GLUCOSE UR QL STRIP: NEGATIVE
HGB UR QL STRIP: NEGATIVE
HYALINE CASTS #/AREA URNS LPF: ABNORMAL /LPF
KETONES UR QL STRIP: NEGATIVE
LEUKOCYTE ESTERASE UR QL STRIP: ABNORMAL
NITRITE UR QL STRIP: NEGATIVE
PH UR STRIP: 7 [PH] (ref 5–8)
PROT UR QL STRIP: ABNORMAL
RBC #/AREA URNS HPF: ABNORMAL /HPF
SERVICE CMNT-IMP: ABNORMAL
SP GR UR STRIP: 1.02 (ref 1–1.03)
SQUAMOUS #/AREA URNS HPF: ABNORMAL /HPF
WBC #/AREA URNS HPF: ABNORMAL /HPF

## 2025-04-28 ENCOUNTER — APPOINTMENT (OUTPATIENT)
Age: 80
End: 2025-04-28
Payer: MEDICARE

## 2025-04-28 VITALS
HEART RATE: 67 BPM | OXYGEN SATURATION: 95 % | SYSTOLIC BLOOD PRESSURE: 142 MMHG | BODY MASS INDEX: 27.81 KG/M2 | WEIGHT: 162 LBS | DIASTOLIC BLOOD PRESSURE: 98 MMHG

## 2025-04-28 DIAGNOSIS — N39.0 ACUTE UTI: ICD-10-CM

## 2025-04-28 PROCEDURE — 3077F SYST BP >= 140 MM HG: CPT | Performed by: FAMILY MEDICINE

## 2025-04-28 PROCEDURE — 1036F TOBACCO NON-USER: CPT | Performed by: FAMILY MEDICINE

## 2025-04-28 PROCEDURE — 1159F MED LIST DOCD IN RCRD: CPT | Performed by: FAMILY MEDICINE

## 2025-04-28 PROCEDURE — 3080F DIAST BP >= 90 MM HG: CPT | Performed by: FAMILY MEDICINE

## 2025-04-28 PROCEDURE — 99213 OFFICE O/P EST LOW 20 MIN: CPT | Performed by: FAMILY MEDICINE

## 2025-04-28 PROCEDURE — 1160F RVW MEDS BY RX/DR IN RCRD: CPT | Performed by: FAMILY MEDICINE

## 2025-04-28 RX ORDER — SULFAMETHOXAZOLE AND TRIMETHOPRIM 800; 160 MG/1; MG/1
1 TABLET ORAL 2 TIMES DAILY
Qty: 14 TABLET | Refills: 0 | Status: SHIPPED | OUTPATIENT
Start: 2025-04-28

## 2025-04-28 NOTE — PROGRESS NOTES
Subjective   Patient ID: Cele Lunsford is a 80 y.o. female who presents for Follow-up (From Dr. Herman).    HPI   Lreviewed dr stanley note3 for uti, uses vag cream twice a week sincelast ov owth dr bradley.  Has used sulfa x2. Reviewed that dr thacker wants to treat only for classcal sx and recc c+s..  Is planning an extensive travel itlinerary, and is worried that will be out without rx.  Uses test strips. Will refill bactrim for travel  Bety 150/92. Home bp are at goal  Recent uti with casts both hyaline and waxy reviewing on file labs shows that she does have proteinuria from time to time.  At this point we will get a another microscopic urine looking for casts.  The most recent specimen had 30-40 epithelial cells.  Discussed clean-catch midstream and patient voiced difficulty obtaining such as specimen.    Review of Systems  No fever chills no frequency urgency or dysuria  Objective   BP (!) 142/98   Pulse 67   Wt 73.5 kg (162 lb)   SpO2 95%   BMI 27.81 kg/m²     Physical Exam  Heart regular lungs clear abdomen soft  Assessment/Plan   Problem List Items Addressed This Visit    None  Visit Diagnoses         Codes      Acute UTI     N39.0    Relevant Medications    sulfamethoxazole-trimethoprim (Bactrim DS) 800-160 mg tablet    Other Relevant Orders    Urinalysis with Reflex Culture and Microscopic

## 2025-04-29 LAB
APPEARANCE UR: CLEAR
BACTERIA #/AREA URNS HPF: ABNORMAL /HPF
BACTERIA UR CULT: ABNORMAL
BILIRUB UR QL STRIP: NEGATIVE
COLOR UR: YELLOW
GLUCOSE UR QL STRIP: NEGATIVE
HGB UR QL STRIP: NEGATIVE
HYALINE CASTS #/AREA URNS LPF: ABNORMAL /LPF
KETONES UR QL STRIP: ABNORMAL
LEUKOCYTE ESTERASE UR QL STRIP: ABNORMAL
NITRITE UR QL STRIP: NEGATIVE
PH UR STRIP: 7.5 [PH] (ref 5–8)
PROT UR QL STRIP: ABNORMAL
RBC #/AREA URNS HPF: ABNORMAL /HPF
SERVICE CMNT-IMP: ABNORMAL
SP GR UR STRIP: 1.01 (ref 1–1.03)
SQUAMOUS #/AREA URNS HPF: ABNORMAL /HPF
WBC #/AREA URNS HPF: ABNORMAL /HPF

## 2025-08-21 ENCOUNTER — TELEPHONE (OUTPATIENT)
Age: 80
End: 2025-08-21

## 2025-08-21 ENCOUNTER — APPOINTMENT (OUTPATIENT)
Age: 80
End: 2025-08-21
Payer: MEDICARE

## 2025-08-21 VITALS
SYSTOLIC BLOOD PRESSURE: 148 MMHG | OXYGEN SATURATION: 99 % | HEART RATE: 71 BPM | WEIGHT: 162 LBS | BODY MASS INDEX: 27.81 KG/M2 | DIASTOLIC BLOOD PRESSURE: 98 MMHG

## 2025-08-21 DIAGNOSIS — E78.5 HYPERLIPIDEMIA, UNSPECIFIED HYPERLIPIDEMIA TYPE: ICD-10-CM

## 2025-08-21 DIAGNOSIS — B37.0 THRUSH: ICD-10-CM

## 2025-08-21 DIAGNOSIS — R60.0 PERIPHERAL EDEMA: ICD-10-CM

## 2025-08-21 DIAGNOSIS — M17.0 PRIMARY OSTEOARTHRITIS OF BOTH KNEES: ICD-10-CM

## 2025-08-21 DIAGNOSIS — N39.0 ACUTE UTI: ICD-10-CM

## 2025-08-21 DIAGNOSIS — K21.9 GASTROESOPHAGEAL REFLUX DISEASE, UNSPECIFIED WHETHER ESOPHAGITIS PRESENT: ICD-10-CM

## 2025-08-21 DIAGNOSIS — E78.2 MIXED HYPERLIPIDEMIA: ICD-10-CM

## 2025-08-21 DIAGNOSIS — Z00.00 ROUTINE GENERAL MEDICAL EXAMINATION AT HEALTH CARE FACILITY: Primary | ICD-10-CM

## 2025-08-21 DIAGNOSIS — I10 BENIGN ESSENTIAL HTN: ICD-10-CM

## 2025-08-21 DIAGNOSIS — J31.0 CHRONIC RHINITIS: ICD-10-CM

## 2025-08-21 DIAGNOSIS — Z12.31 BREAST CANCER SCREENING BY MAMMOGRAM: Primary | ICD-10-CM

## 2025-08-21 DIAGNOSIS — I10 HYPERTENSION, UNSPECIFIED TYPE: ICD-10-CM

## 2025-08-21 DIAGNOSIS — G45.9 TIA (TRANSIENT ISCHEMIC ATTACK): ICD-10-CM

## 2025-08-21 DIAGNOSIS — N39.46 MIXED STRESS AND URGE URINARY INCONTINENCE: ICD-10-CM

## 2025-08-21 DIAGNOSIS — R21 RASH: ICD-10-CM

## 2025-08-21 DIAGNOSIS — K21.9 GASTROESOPHAGEAL REFLUX DISEASE WITHOUT ESOPHAGITIS: ICD-10-CM

## 2025-08-21 LAB
POC APPEARANCE, URINE: CLEAR
POC BILIRUBIN, URINE: NEGATIVE
POC BLOOD, URINE: ABNORMAL
POC COLOR, URINE: YELLOW
POC GLUCOSE, URINE: NEGATIVE MG/DL
POC KETONES, URINE: NEGATIVE MG/DL
POC LEUKOCYTES, URINE: ABNORMAL
POC NITRITE,URINE: NEGATIVE
POC PH, URINE: 7 PH
POC PROTEIN, URINE: NEGATIVE MG/DL
POC SPECIFIC GRAVITY, URINE: 1.01
POC UROBILINOGEN, URINE: 0.2 EU/DL

## 2025-08-21 PROCEDURE — 3077F SYST BP >= 140 MM HG: CPT | Performed by: FAMILY MEDICINE

## 2025-08-21 PROCEDURE — 81003 URINALYSIS AUTO W/O SCOPE: CPT | Performed by: FAMILY MEDICINE

## 2025-08-21 PROCEDURE — 1170F FXNL STATUS ASSESSED: CPT | Performed by: FAMILY MEDICINE

## 2025-08-21 PROCEDURE — G0439 PPPS, SUBSEQ VISIT: HCPCS | Performed by: FAMILY MEDICINE

## 2025-08-21 PROCEDURE — 3080F DIAST BP >= 90 MM HG: CPT | Performed by: FAMILY MEDICINE

## 2025-08-21 PROCEDURE — 1036F TOBACCO NON-USER: CPT | Performed by: FAMILY MEDICINE

## 2025-08-21 PROCEDURE — 1159F MED LIST DOCD IN RCRD: CPT | Performed by: FAMILY MEDICINE

## 2025-08-21 PROCEDURE — 1124F ACP DISCUSS-NO DSCNMKR DOCD: CPT | Performed by: FAMILY MEDICINE

## 2025-08-21 PROCEDURE — 1160F RVW MEDS BY RX/DR IN RCRD: CPT | Performed by: FAMILY MEDICINE

## 2025-08-21 PROCEDURE — 99214 OFFICE O/P EST MOD 30 MIN: CPT | Performed by: FAMILY MEDICINE

## 2025-08-21 RX ORDER — LOSARTAN POTASSIUM 50 MG/1
50 TABLET ORAL DAILY
Qty: 30 TABLET | Refills: 11 | Status: SHIPPED | OUTPATIENT
Start: 2025-08-21 | End: 2026-08-21

## 2025-08-21 RX ORDER — NYSTATIN 100000 [USP'U]/ML
500000 SUSPENSION ORAL 4 TIMES DAILY
Qty: 280 ML | Refills: 1 | Status: SHIPPED | OUTPATIENT
Start: 2025-08-21 | End: 2025-09-04

## 2025-08-21 RX ORDER — LOSARTAN POTASSIUM 50 MG/1
50 TABLET ORAL DAILY
Qty: 90 TABLET | Refills: 3 | Status: SHIPPED | OUTPATIENT
Start: 2025-08-21 | End: 2025-08-21 | Stop reason: SDUPTHER

## 2025-08-21 ASSESSMENT — ACTIVITIES OF DAILY LIVING (ADL)
TAKING_MEDICATION: INDEPENDENT
MANAGING_FINANCES: INDEPENDENT
BATHING: INDEPENDENT
DOING_HOUSEWORK: INDEPENDENT
GROCERY_SHOPPING: INDEPENDENT
DRESSING: INDEPENDENT

## 2025-08-21 ASSESSMENT — ENCOUNTER SYMPTOMS
OCCASIONAL FEELINGS OF UNSTEADINESS: 1
LOSS OF SENSATION IN FEET: 0
DEPRESSION: 0

## 2025-08-21 ASSESSMENT — PATIENT HEALTH QUESTIONNAIRE - PHQ9
1. LITTLE INTEREST OR PLEASURE IN DOING THINGS: NOT AT ALL
2. FEELING DOWN, DEPRESSED OR HOPELESS: NOT AT ALL
SUM OF ALL RESPONSES TO PHQ9 QUESTIONS 1 AND 2: 0

## 2025-08-27 ENCOUNTER — APPOINTMENT (OUTPATIENT)
Dept: RADIOLOGY | Facility: HOSPITAL | Age: 80
End: 2025-08-27
Payer: MEDICARE

## 2025-08-27 ENCOUNTER — HOSPITAL ENCOUNTER (OUTPATIENT)
Dept: RADIOLOGY | Facility: CLINIC | Age: 80
Discharge: HOME | End: 2025-08-27
Payer: MEDICARE

## 2025-08-27 VITALS — BODY MASS INDEX: 27.66 KG/M2 | HEIGHT: 64 IN | WEIGHT: 162.04 LBS

## 2025-08-27 DIAGNOSIS — Z12.31 BREAST CANCER SCREENING BY MAMMOGRAM: ICD-10-CM

## 2025-08-27 PROCEDURE — 77067 SCR MAMMO BI INCL CAD: CPT | Performed by: RADIOLOGY

## 2025-08-27 PROCEDURE — 77067 SCR MAMMO BI INCL CAD: CPT

## 2025-08-27 PROCEDURE — 77063 BREAST TOMOSYNTHESIS BI: CPT | Performed by: RADIOLOGY

## 2026-02-26 ENCOUNTER — APPOINTMENT (OUTPATIENT)
Age: 81
End: 2026-02-26
Payer: MEDICARE

## 2026-04-23 ENCOUNTER — APPOINTMENT (OUTPATIENT)
Dept: OBSTETRICS AND GYNECOLOGY | Facility: CLINIC | Age: 81
End: 2026-04-23
Payer: MEDICARE